# Patient Record
Sex: FEMALE | Race: WHITE | URBAN - METROPOLITAN AREA
[De-identification: names, ages, dates, MRNs, and addresses within clinical notes are randomized per-mention and may not be internally consistent; named-entity substitution may affect disease eponyms.]

---

## 2017-10-23 ENCOUNTER — HISTORICAL (OUTPATIENT)
Dept: ADMINISTRATIVE | Facility: HOSPITAL | Age: 52
End: 2017-10-23

## 2018-08-09 ENCOUNTER — HISTORICAL (OUTPATIENT)
Dept: RADIOLOGY | Facility: HOSPITAL | Age: 53
End: 2018-08-09

## 2020-02-20 LAB
BILIRUB SERPL-MCNC: NEGATIVE MG/DL
BLOOD URINE, POC: NEGATIVE
CLARITY, POC UA: NORMAL
COLOR, POC UA: YELLOW
GLUCOSE UR QL STRIP: NEGATIVE
KETONES UR QL STRIP: NEGATIVE
LEUKOCYTE EST, POC UA: NEGATIVE
NITRITE, POC UA: NEGATIVE
PH, POC UA: 5.5
PROTEIN, POC: NEGATIVE
SPECIFIC GRAVITY, POC UA: 1.01
UROBILINOGEN, POC UA: NORMAL

## 2022-04-11 ENCOUNTER — HISTORICAL (OUTPATIENT)
Dept: ADMINISTRATIVE | Facility: HOSPITAL | Age: 57
End: 2022-04-11

## 2022-04-29 VITALS
WEIGHT: 136.88 LBS | OXYGEN SATURATION: 96 % | BODY MASS INDEX: 23.37 KG/M2 | DIASTOLIC BLOOD PRESSURE: 70 MMHG | HEIGHT: 64 IN | SYSTOLIC BLOOD PRESSURE: 108 MMHG

## 2022-08-12 DIAGNOSIS — R52 ACHES: Primary | ICD-10-CM

## 2022-09-21 ENCOUNTER — HISTORICAL (OUTPATIENT)
Dept: ADMINISTRATIVE | Facility: HOSPITAL | Age: 57
End: 2022-09-21

## 2023-08-03 DIAGNOSIS — R42 DIZZINESS: Primary | ICD-10-CM

## 2023-08-17 ENCOUNTER — TELEPHONE (OUTPATIENT)
Dept: NEUROLOGY | Facility: CLINIC | Age: 58
End: 2023-08-17

## 2023-08-17 NOTE — TELEPHONE ENCOUNTER
L/M ashwin Aguiar with referring providers office explaining referral will continue to be on hold until patient cleared by ENT and records received for dizziness or clinic is contacted to cancel referral.

## 2024-06-13 ENCOUNTER — DOCUMENTATION ONLY (OUTPATIENT)
Dept: AUDIOLOGY | Facility: HOSPITAL | Age: 59
End: 2024-06-13
Payer: MEDICAID

## 2024-06-13 NOTE — PROGRESS NOTES
Spoke with patient regarding VNG testing, protocol and medication avoidance.  She reportedly does not take Meclizine nor pain medication.  Advised her to discontinue sleep medication the night prior to testing.  Mrs. Chavez endorses multiple autoimmune diagnosis and severe claustrophobia and panic attacks, therefore will not perform caloric irrigation testing under closed goggle array.  Will substitute with cervical VEMP testing at this time.  An audiological evaluation was previously performed at [facility] yielding significant SNHL, bilaterally.

## 2024-07-11 ENCOUNTER — CLINICAL SUPPORT (OUTPATIENT)
Dept: AUDIOLOGY | Facility: HOSPITAL | Age: 59
End: 2024-07-11
Payer: MEDICAID

## 2024-07-11 DIAGNOSIS — H81.90 DISORDER OF VESTIBULAR FUNCTION, UNSPECIFIED LATERALITY: Primary | ICD-10-CM

## 2024-07-11 PROCEDURE — 92567 TYMPANOMETRY: CPT | Performed by: AUDIOLOGIST-HEARING AID FITTER

## 2024-07-11 PROCEDURE — 92517 VEMP TEST I&R CERVICAL: CPT | Performed by: AUDIOLOGIST-HEARING AID FITTER

## 2024-07-11 PROCEDURE — 92540 BASIC VESTIBULAR EVALUATION: CPT | Performed by: AUDIOLOGIST-HEARING AID FITTER

## 2024-09-10 NOTE — PROGRESS NOTES
"[facility] Neurology Initial Office Visit Note    Initial Visit Date: 9/12/2024  Current Visit Date:  09/12/2024    Chief Complaint:     Chief Complaint   Patient presents with    Dizziness     Patient states she ant stay on her feet. She states she fall frequently.Patient states she has a lot of muscle spasms.        History of Present Illness:      This is 59 y.o. female with history of histoplasmosis s/p lobectomy, ?sceloroderma, COPD, ORTEGA, anxiety, depression who is referred for poor balance since 2011. Patient states that her left leg would "give out on her." She stated that she had multiple spinal surgeries in the past for chronic left leg weakness which had progressively worse since this past 1 year. Complaining of mostly allodynia and hypesthesia in left lower leg.  She had never underwent physical therapy.     Medications:     Current Outpatient Medications   Medication Instructions    albuterol (PROVENTIL/VENTOLIN HFA) 90 mcg/actuation inhaler 2 puffs, Every 4 hours PRN    ALPRAZolam (XANAX) 2 mg, Oral, 3 times daily    aspirin (ECOTRIN) 81 mg, Oral, Daily    magnesium gluconate 27.5 mg magne- sium (500 mg) Tab Oral, Once    multivitamin (THERAGRAN) per tablet 1 tablet, Oral, Daily    ramelteon (ROZEREM) 8 mg, Oral, Nightly PRN    rosuvastatin (CRESTOR) 20 mg, Oral    tiZANidine (ZANAFLEX) 4 mg, Oral, 3 times daily    TRELEGY ELLIPTA 100-62.5-25 mcg DsDv        Labs:     Results for orders placed or performed in visit on 09/21/22   POCT Urinalysis   Result Value Ref Range    Color, UA Yellow     Clarity, UA Slightly cloudy     Glucose, UA Negative     Bilirubin, POC Negative     Ketones, UA Negative     Spec Grav UA 1.015     Blood, UA Negative     pH, UA 5.5     Protein, POC Negative     Urobilinogen, UA 0.2 mg/dl     Nitrite, UA Negative     Leukocytes, UA Negative        Studies:      Vestibular Results 7/11/2024:     Gaze:              No gaze-evoked nystagmus.    Saccades:      Normal saccade velocity, " latency and accuracy.         Tracking:        Normal tracking accuracy and gain.  OPK:               No asymmetry noted.    Positionals:    No positional nystagmus noted for all test conditions with vision denied.  Positioning:   No paroxysmal positional nystagmus noted.   Calorics:         Not performed due to patient's severe claustrophobia (under goggle array) and severe anxiety.                          cVEMP testing performed in lieu of caloric testing.     cVEMP Results:     Right ear:        7.68      Left ear:           9.06      Asymmetry Ratio:    8%-Within normal limits      This patient's videonystagmogram was normal.  Oculomotor testing was within normal limits. Positional testing did not reveal any nystagmus for all test conditions with vision denied. Positioning testing was negative for canalith involvement, bilaterally.   cVEMP testing was within normal limits with a ratio of 8%.  This finding is indicative of normal saccule and inferior nerve function, bilaterally.  Todays findings indicative of normal peripheral function, bilaterally.         Review of Systems:     Review of Systems   All other systems reviewed and are negative.      Physical Exams:     Vitals:    09/12/24 1439   BP: 124/86   Pulse: 86   Resp: 16   Temp: 97.8 °F (36.6 °C)       Physical Exam  Vitals and nursing note reviewed.   Constitutional:       Appearance: Normal appearance.   HENT:      Head: Normocephalic and atraumatic.      Nose: Nose normal.      Mouth/Throat:      Mouth: Mucous membranes are moist.      Pharynx: Oropharynx is clear.   Eyes:      Conjunctiva/sclera: Conjunctivae normal.   Cardiovascular:      Rate and Rhythm: Normal rate and regular rhythm.      Pulses: Normal pulses.   Pulmonary:      Effort: Pulmonary effort is normal.      Breath sounds: Normal breath sounds.   Abdominal:      General: Abdomen is flat.   Musculoskeletal:         General: Normal range of motion.      Cervical back: Normal range of  motion.   Skin:     General: Skin is warm.   Neurological:      Mental Status: She is alert.         Comprehensive Neurological Exam:  Mental Status: Alert Oriented to Self, Date, and Place. Comprehension wnl. No dysarthria.   CN II - XII: NÉSTOR, No APD,  VFFC, No ptosis OU, EOMI without nystagmus, LT/Temp symmetric in CN V1-3 distribution, Hearing grossly intact, Face Symmetric, Tongue and Uvula midline, Trapezius symmetric bilateral.   Motor: tone and bulk wnl throughout, no abnormal involuntary or voluntary movements, 4/5 to confrontation in left deltoid and  4/5 left EHL/TA/PL/Quad, Fine finger movements wnl b/l, No pronator drift.   Sensory: LT, Proprioception, Vibration with hypesthesia in LLE, Temp decreased in L5/S1 distribution.   Reflexes: 2+ throughout, plantar reflexes downward bilateral.   Gait: left foot drop     Assessment:     This is 59 y.o. female with history of histoplasmosis s/p lobectomy, ?sceloroderma, COPD, ORTEGA, anxiety, depression who is referred for left L5-S1 radiculopathy lumbar and cervical C5 radiculopathy.      Problem List Items Addressed This Visit          Neuro    Left cervical radiculopathy    Relevant Orders    Ambulatory referral/consult to Physical/Occupational Therapy    MRI Cervical Spine Without Contrast    MRI Lumbar Spine Without Contrast    Chronic left-sided lumbar radiculopathy - Primary    Relevant Orders    Ambulatory referral/consult to Physical/Occupational Therapy    MRI Cervical Spine Without Contrast    MRI Lumbar Spine Without Contrast     Other Visit Diagnoses       Dorsalgia, unspecified        Relevant Orders    MRI Cervical Spine Without Contrast    MRI Lumbar Spine Without Contrast            Plan:     [] MRI C and Lumbar spine w/o Kwaku: patient prefers [facility] imaging   [] outpatient PT/OT    RTC 4 Months      Visit today is associated with current or anticipated ongoing medical care related to this patient's single serious condition/complex condition as  documented above.     I have explained the treatment plan, diagnosis, and prognosis to patient. All questions are answered to the best of my knowledge.     Face to face time 60 minutes, including documentation, chart review, counseling, education, review of test results, relevant medical records, and coordination of care.   I have explained the treatment plan, diagnosis, and prognosis to patient. All questions are answered to the best of my knowledge.       Dalia Sue MD   General Neurology  09/12/2024

## 2024-09-12 ENCOUNTER — OFFICE VISIT (OUTPATIENT)
Dept: NEUROLOGY | Facility: CLINIC | Age: 59
End: 2024-09-12
Payer: MEDICAID

## 2024-09-12 VITALS
RESPIRATION RATE: 16 BRPM | DIASTOLIC BLOOD PRESSURE: 86 MMHG | TEMPERATURE: 98 F | HEIGHT: 64 IN | OXYGEN SATURATION: 99 % | SYSTOLIC BLOOD PRESSURE: 124 MMHG | WEIGHT: 132.38 LBS | BODY MASS INDEX: 22.6 KG/M2 | HEART RATE: 86 BPM

## 2024-09-12 DIAGNOSIS — M54.12 LEFT CERVICAL RADICULOPATHY: ICD-10-CM

## 2024-09-12 DIAGNOSIS — M54.9 DORSALGIA, UNSPECIFIED: ICD-10-CM

## 2024-09-12 DIAGNOSIS — M54.16 CHRONIC LEFT-SIDED LUMBAR RADICULOPATHY: Primary | ICD-10-CM

## 2024-09-12 PROCEDURE — 99215 OFFICE O/P EST HI 40 MIN: CPT | Mod: PBBFAC | Performed by: PSYCHIATRY & NEUROLOGY

## 2024-09-12 RX ORDER — MAGNESIUM GLUCONATE 27.5 (500)
TABLET ORAL ONCE
COMMUNITY

## 2024-09-12 RX ORDER — ROSUVASTATIN CALCIUM 20 MG/1
20 TABLET, COATED ORAL
COMMUNITY
Start: 2024-09-03

## 2024-09-12 RX ORDER — TIZANIDINE 4 MG/1
4 TABLET ORAL 3 TIMES DAILY
COMMUNITY
Start: 2024-07-23

## 2024-09-12 RX ORDER — ALBUTEROL SULFATE 90 UG/1
2 INHALANT RESPIRATORY (INHALATION) EVERY 4 HOURS PRN
COMMUNITY
Start: 2024-08-15

## 2024-09-12 RX ORDER — RAMELTEON 8 MG/1
8 TABLET ORAL NIGHTLY PRN
COMMUNITY
Start: 2024-09-10

## 2024-09-12 RX ORDER — MULTIVITAMIN
1 TABLET ORAL DAILY
COMMUNITY

## 2024-09-12 RX ORDER — ALPRAZOLAM 2 MG/1
2 TABLET ORAL 3 TIMES DAILY
COMMUNITY
Start: 2024-08-13

## 2024-09-12 RX ORDER — ASPIRIN 81 MG/1
81 TABLET ORAL DAILY
COMMUNITY

## 2024-09-12 RX ORDER — FLUTICASONE FUROATE, UMECLIDINIUM BROMIDE AND VILANTEROL TRIFENATATE 100; 62.5; 25 UG/1; UG/1; UG/1
POWDER RESPIRATORY (INHALATION)
COMMUNITY
Start: 2024-07-01

## 2024-09-30 ENCOUNTER — TELEPHONE (OUTPATIENT)
Dept: NEUROLOGY | Facility: CLINIC | Age: 59
End: 2024-09-30
Payer: MEDICAID

## 2024-09-30 NOTE — TELEPHONE ENCOUNTER
----- Message from Dalia Sue MD sent at 9/30/2024  8:14 AM CDT -----  Regarding: MRI Results  Please let Ms. Chavez know that her MRI C and L spine showed mild degenerative changes but otherwise no major pathology needing surgical intervention seen. Patient will benefit from continuing PT/OT.

## 2024-10-01 NOTE — TELEPHONE ENCOUNTER
----- Message from Naomi sent at 10/1/2024  9:30 AM CDT -----  Regarding: returned call  Pt returned call. Pt can be reached at [phone]    Thank You

## 2024-11-15 ENCOUNTER — TELEPHONE (OUTPATIENT)
Dept: NEUROLOGY | Facility: CLINIC | Age: 59
End: 2024-11-15
Payer: MEDICAID

## 2024-11-15 NOTE — TELEPHONE ENCOUNTER
----- Message from Shyla sent at 11/15/2024 12:22 PM CST -----  Pt lvm @ 11:01 am requesting a call back from the nurse, pt can be reached @ [phone]

## 2024-11-15 NOTE — TELEPHONE ENCOUNTER
Patient states she is in bed for 2-3 days after physical therapy. Patient is asking if there another form of therapy to get relief. She wants to know if physical therapy is necessary?

## 2024-12-02 ENCOUNTER — TELEPHONE (OUTPATIENT)
Dept: NEUROLOGY | Facility: CLINIC | Age: 59
End: 2024-12-02
Payer: MEDICAID

## 2024-12-02 NOTE — TELEPHONE ENCOUNTER
----- Message from Dalia Sue MD sent at 12/2/2024 10:26 AM CST -----  Regarding: RE: Patient call back  Patient already undergoing evaluation at [facility].      Dr. Sue  ----- Message -----  From: Alia Stratton LPN  Sent: 12/2/2024  10:18 AM CST  To: Dalia Sue MD  Subject: FW: Patient call back                              ----- Message -----  From: Clover Avilez  Sent: 12/2/2024  10:15 AM CST  To: [facility] Neurology Clinical Support Staff  Subject: Patient call back                                Patient called and stated she would like to speak to a nurse regarding wanting a referral for physical therapy.     Patient can be reached at [phone]    Please Advise

## 2024-12-02 NOTE — TELEPHONE ENCOUNTER
Patient states she is still having pain from physical therapy. Patient was notified that she could ask for a decrease in the therapy treatment and she will not be going back to therapy. The next step will be discussed at the patients next appointment.

## 2024-12-10 DIAGNOSIS — M54.16 CHRONIC LEFT-SIDED LUMBAR RADICULOPATHY: Primary | ICD-10-CM

## 2024-12-17 ENCOUNTER — TELEPHONE (OUTPATIENT)
Dept: NEUROLOGY | Facility: CLINIC | Age: 59
End: 2024-12-17
Payer: MEDICAID

## 2024-12-17 NOTE — TELEPHONE ENCOUNTER
----- Message from Sharyn sent at 12/17/2024  8:43 AM CST -----  Regarding: pain management referral  Pt called about the pain management referral . She can't drive to [place] and needs clarification on what the pain management does in detail.  She would like a referral sent somewhere local .   Pt # [phone]  
Spoke with patient and she verbalized understanding.   
No

## 2025-01-22 ENCOUNTER — OFFICE VISIT (OUTPATIENT)
Dept: NEUROLOGY | Facility: CLINIC | Age: 60
End: 2025-01-22
Payer: MEDICAID

## 2025-01-22 DIAGNOSIS — M54.16 CHRONIC LEFT-SIDED LUMBAR RADICULOPATHY: Primary | ICD-10-CM

## 2025-05-16 ENCOUNTER — HOSPITAL ENCOUNTER (EMERGENCY)
Facility: HOSPITAL | Age: 60
Discharge: ANOTHER HOSPITAL | End: 2025-05-16
Attending: INTERNAL MEDICINE
Payer: MEDICAID

## 2025-05-16 ENCOUNTER — HOSPITAL ENCOUNTER (INPATIENT)
Facility: HOSPITAL | Age: 60
LOS: 3 days | Discharge: HOME | DRG: 037 | End: 2025-05-19
Attending: EMERGENCY MEDICINE | Admitting: INTERNAL MEDICINE
Payer: MEDICAID

## 2025-05-16 VITALS
WEIGHT: 125 LBS | RESPIRATION RATE: 18 BRPM | HEART RATE: 76 BPM | SYSTOLIC BLOOD PRESSURE: 126 MMHG | BODY MASS INDEX: 21.46 KG/M2 | DIASTOLIC BLOOD PRESSURE: 82 MMHG | OXYGEN SATURATION: 100 % | TEMPERATURE: 98 F

## 2025-05-16 DIAGNOSIS — I65.22 STENOSIS OF LEFT CAROTID ARTERY: ICD-10-CM

## 2025-05-16 DIAGNOSIS — I10 BENIGN ESSENTIAL HTN: ICD-10-CM

## 2025-05-16 DIAGNOSIS — I63.9 STROKE: ICD-10-CM

## 2025-05-16 DIAGNOSIS — R07.9 CHEST PAIN: ICD-10-CM

## 2025-05-16 DIAGNOSIS — I65.22 LEFT CAROTID STENOSIS: Primary | ICD-10-CM

## 2025-05-16 DIAGNOSIS — G45.3 AMAUROSIS FUGAX OF LEFT EYE: Primary | ICD-10-CM

## 2025-05-16 DIAGNOSIS — H54.62 VISION LOSS OF LEFT EYE: ICD-10-CM

## 2025-05-16 PROBLEM — F17.200 TOBACCO DEPENDENCY: Status: ACTIVE | Noted: 2025-05-16

## 2025-05-16 LAB
ALBUMIN SERPL-MCNC: 4.3 G/DL (ref 3.4–4.8)
ALBUMIN/GLOB SERPL: 1 RATIO (ref 1.1–2)
ALP SERPL-CCNC: 77 UNIT/L (ref 40–150)
ALT SERPL-CCNC: 10 UNIT/L (ref 0–55)
ANION GAP SERPL CALC-SCNC: 10 MEQ/L
APICAL FOUR CHAMBER EJECTION FRACTION: 60 %
APICAL TWO CHAMBER EJECTION FRACTION: 64 %
APTT PPP: 27.6 SECONDS (ref 23.2–33.7)
APTT PPP: 37.9 SECONDS (ref 23.2–33.7)
AST SERPL-CCNC: 17 UNIT/L (ref 11–45)
AV INDEX (PROSTH): 0.88
AV MEAN GRADIENT: 3 MMHG
AV PEAK GRADIENT: 6 MMHG
AV VALVE AREA BY VELOCITY RATIO: 2.4 CM²
AV VALVE AREA: 2.5 CM²
AV VELOCITY RATIO: 0.83
BASOPHILS # BLD AUTO: 0.03 X10(3)/MCL
BASOPHILS NFR BLD AUTO: 0.4 %
BILIRUB SERPL-MCNC: 0.4 MG/DL
BSA FOR ECHO PROCEDURE: 1.57 M2
BUN SERPL-MCNC: 11.3 MG/DL (ref 9.8–20.1)
CALCIUM SERPL-MCNC: 10.1 MG/DL (ref 8.4–10.2)
CHLORIDE SERPL-SCNC: 106 MMOL/L (ref 98–107)
CHOLEST SERPL-MCNC: 121 MG/DL
CHOLEST/HDLC SERPL: 3 {RATIO} (ref 0–5)
CO2 SERPL-SCNC: 23 MMOL/L (ref 23–31)
CREAT SERPL-MCNC: 0.68 MG/DL (ref 0.55–1.02)
CREAT/UREA NIT SERPL: 17
CV ECHO LV RWT: 0.42 CM
DOP CALC AO PEAK VEL: 1.2 M/S
DOP CALC AO VTI: 24.4 CM
DOP CALC LVOT AREA: 2.8 CM2
DOP CALC LVOT DIAMETER: 1.9 CM
DOP CALC LVOT PEAK VEL: 1 M/S
DOP CALC LVOT STROKE VOLUME: 60.6 CM3
DOP CALC MV VTI: 23.4 CM
DOP CALCLVOT PEAK VEL VTI: 21.4 CM
E WAVE DECELERATION TIME: 187 MSEC
E/A RATIO: 0.86
E/E' RATIO: 11 M/S
ECHO LV POSTERIOR WALL: 0.9 CM (ref 0.6–1.1)
EOSINOPHIL # BLD AUTO: 0.06 X10(3)/MCL (ref 0–0.9)
EOSINOPHIL NFR BLD AUTO: 0.8 %
ERYTHROCYTE [DISTWIDTH] IN BLOOD BY AUTOMATED COUNT: 12.8 % (ref 11.5–17)
EST. AVERAGE GLUCOSE BLD GHB EST-MCNC: 114 MG/DL
FRACTIONAL SHORTENING: 39.5 % (ref 28–44)
GFR SERPLBLD CREATININE-BSD FMLA CKD-EPI: >60 ML/MIN/1.73/M2
GLOBULIN SER-MCNC: 4.1 GM/DL (ref 2.4–3.5)
GLUCOSE SERPL-MCNC: 94 MG/DL (ref 82–115)
HBA1C MFR BLD: 5.6 %
HCT VFR BLD AUTO: 38.7 % (ref 37–47)
HDLC SERPL-MCNC: 46 MG/DL (ref 35–60)
HGB BLD-MCNC: 13.4 G/DL (ref 12–16)
HOLD SPECIMEN: NORMAL
HOLD SPECIMEN: NORMAL
IMM GRANULOCYTES # BLD AUTO: 0.02 X10(3)/MCL (ref 0–0.04)
IMM GRANULOCYTES NFR BLD AUTO: 0.3 %
INR PPP: 1
INTERVENTRICULAR SEPTUM: 0.9 CM (ref 0.6–1.1)
LDLC SERPL CALC-MCNC: 65 MG/DL (ref 50–140)
LEFT ATRIUM AREA SYSTOLIC (APICAL 2 CHAMBER): 14.2 CM2
LEFT ATRIUM AREA SYSTOLIC (APICAL 4 CHAMBER): 14.5 CM2
LEFT ATRIUM SIZE: 3.3 CM
LEFT ATRIUM VOLUME INDEX MOD: 21 ML/M2
LEFT ATRIUM VOLUME MOD: 33 ML
LEFT INTERNAL DIMENSION IN SYSTOLE: 2.6 CM (ref 2.1–4)
LEFT VENTRICLE DIASTOLIC VOLUME INDEX: 51.59 ML/M2
LEFT VENTRICLE DIASTOLIC VOLUME: 81 ML
LEFT VENTRICLE END DIASTOLIC VOLUME APICAL 2 CHAMBER: 48.6 ML
LEFT VENTRICLE END DIASTOLIC VOLUME APICAL 4 CHAMBER: 38.6 ML
LEFT VENTRICLE END SYSTOLIC VOLUME APICAL 2 CHAMBER: 33.3 ML
LEFT VENTRICLE END SYSTOLIC VOLUME APICAL 4 CHAMBER: 33.3 ML
LEFT VENTRICLE MASS INDEX: 78.5 G/M2
LEFT VENTRICLE SYSTOLIC VOLUME INDEX: 15.9 ML/M2
LEFT VENTRICLE SYSTOLIC VOLUME: 25 ML
LEFT VENTRICULAR INTERNAL DIMENSION IN DIASTOLE: 4.3 CM (ref 3.5–6)
LEFT VENTRICULAR MASS: 123.3 G
LV LATERAL E/E' RATIO: 14 M/S
LV SEPTAL E/E' RATIO: 9.3 M/S
LVED V (TEICH): 81.3 ML
LVES V (TEICH): 24.6 ML
LVOT MG: 2 MMHG
LVOT MV: 0.68 CM/S
LYMPHOCYTES # BLD AUTO: 2.76 X10(3)/MCL (ref 0.6–4.6)
LYMPHOCYTES NFR BLD AUTO: 36.2 %
MCH RBC QN AUTO: 33.7 PG (ref 27–31)
MCHC RBC AUTO-ENTMCNC: 34.6 G/DL (ref 33–36)
MCV RBC AUTO: 97.2 FL (ref 80–94)
MONOCYTES # BLD AUTO: 0.51 X10(3)/MCL (ref 0.1–1.3)
MONOCYTES NFR BLD AUTO: 6.7 %
MV MEAN GRADIENT: 1 MMHG
MV PEAK A VEL: 0.65 M/S
MV PEAK E VEL: 0.56 M/S
MV PEAK GRADIENT: 3 MMHG
MV VALVE AREA BY CONTINUITY EQUATION: 2.59 CM2
NEUTROPHILS # BLD AUTO: 4.25 X10(3)/MCL (ref 2.1–9.2)
NEUTROPHILS NFR BLD AUTO: 55.6 %
NRBC BLD AUTO-RTO: 0 %
OHS CV RV/LV RATIO: 0.7 CM
OHS LV EJECTION FRACTION SIMPSONS BIPLANE MOD: 63 %
PLATELET # BLD AUTO: 275 X10(3)/MCL (ref 130–400)
PMV BLD AUTO: 9.7 FL (ref 7.4–10.4)
POTASSIUM SERPL-SCNC: 4.3 MMOL/L (ref 3.5–5.1)
PROT SERPL-MCNC: 8.4 GM/DL (ref 5.8–7.6)
PROTHROMBIN TIME: 12.9 SECONDS (ref 11.4–14)
RA WIDTH: 3.68 CM
RBC # BLD AUTO: 3.98 X10(6)/MCL (ref 4.2–5.4)
RIGHT VENTRICLE DIASTOLIC BASEL DIMENSION: 3 CM
RIGHT VENTRICULAR END-DIASTOLIC DIMENSION: 2.97 CM
SODIUM SERPL-SCNC: 139 MMOL/L (ref 136–145)
TDI LATERAL: 0.04 M/S
TDI SEPTAL: 0.06 M/S
TDI: 0.05 M/S
TRICUSPID ANNULAR PLANE SYSTOLIC EXCURSION: 2.5 CM
TRIGL SERPL-MCNC: 51 MG/DL (ref 37–140)
TSH SERPL-ACNC: 0.62 UIU/ML (ref 0.35–4.94)
VLDLC SERPL CALC-MCNC: 10 MG/DL
WBC # BLD AUTO: 7.63 X10(3)/MCL (ref 4.5–11.5)
Z-SCORE OF LEFT VENTRICULAR DIMENSION IN END DIASTOLE: -0.48
Z-SCORE OF LEFT VENTRICULAR DIMENSION IN END SYSTOLE: -0.58

## 2025-05-16 PROCEDURE — 25500020 PHARM REV CODE 255

## 2025-05-16 PROCEDURE — 99222 1ST HOSP IP/OBS MODERATE 55: CPT | Mod: FS,,, | Performed by: PSYCHIATRY & NEUROLOGY

## 2025-05-16 PROCEDURE — 80061 LIPID PANEL: CPT

## 2025-05-16 PROCEDURE — 25000003 PHARM REV CODE 250: Performed by: INTERNAL MEDICINE

## 2025-05-16 PROCEDURE — 25000003 PHARM REV CODE 250: Performed by: EMERGENCY MEDICINE

## 2025-05-16 PROCEDURE — 99285 EMERGENCY DEPT VISIT HI MDM: CPT | Mod: 25

## 2025-05-16 PROCEDURE — 85610 PROTHROMBIN TIME: CPT | Performed by: INTERNAL MEDICINE

## 2025-05-16 PROCEDURE — 21400001 HC TELEMETRY ROOM

## 2025-05-16 PROCEDURE — 63600175 PHARM REV CODE 636 W HCPCS: Performed by: EMERGENCY MEDICINE

## 2025-05-16 PROCEDURE — 80053 COMPREHEN METABOLIC PANEL: CPT | Performed by: INTERNAL MEDICINE

## 2025-05-16 PROCEDURE — 99285 EMERGENCY DEPT VISIT HI MDM: CPT | Mod: 25,27

## 2025-05-16 PROCEDURE — 25000003 PHARM REV CODE 250

## 2025-05-16 PROCEDURE — 85730 THROMBOPLASTIN TIME PARTIAL: CPT | Performed by: INTERNAL MEDICINE

## 2025-05-16 PROCEDURE — 84443 ASSAY THYROID STIM HORMONE: CPT

## 2025-05-16 PROCEDURE — 11000001 HC ACUTE MED/SURG PRIVATE ROOM

## 2025-05-16 PROCEDURE — 83036 HEMOGLOBIN GLYCOSYLATED A1C: CPT

## 2025-05-16 PROCEDURE — 85730 THROMBOPLASTIN TIME PARTIAL: CPT | Performed by: EMERGENCY MEDICINE

## 2025-05-16 PROCEDURE — 85025 COMPLETE CBC W/AUTO DIFF WBC: CPT | Performed by: INTERNAL MEDICINE

## 2025-05-16 RX ORDER — IBUPROFEN 200 MG
16 TABLET ORAL
Status: DISCONTINUED | OUTPATIENT
Start: 2025-05-16 | End: 2025-05-19 | Stop reason: HOSPADM

## 2025-05-16 RX ORDER — ATORVASTATIN CALCIUM 40 MG/1
80 TABLET, FILM COATED ORAL DAILY
Status: DISCONTINUED | OUTPATIENT
Start: 2025-05-17 | End: 2025-05-19 | Stop reason: HOSPADM

## 2025-05-16 RX ORDER — TALC
9 POWDER (GRAM) TOPICAL NIGHTLY PRN
Status: DISCONTINUED | OUTPATIENT
Start: 2025-05-16 | End: 2025-05-19 | Stop reason: HOSPADM

## 2025-05-16 RX ORDER — HEPARIN SODIUM,PORCINE/D5W 25000/250
0-40 INTRAVENOUS SOLUTION INTRAVENOUS CONTINUOUS
Status: DISCONTINUED | OUTPATIENT
Start: 2025-05-16 | End: 2025-05-16 | Stop reason: HOSPADM

## 2025-05-16 RX ORDER — IPRATROPIUM BROMIDE AND ALBUTEROL SULFATE 2.5; .5 MG/3ML; MG/3ML
3 SOLUTION RESPIRATORY (INHALATION) EVERY 6 HOURS PRN
Status: DISCONTINUED | OUTPATIENT
Start: 2025-05-16 | End: 2025-05-19 | Stop reason: HOSPADM

## 2025-05-16 RX ORDER — ALUMINUM HYDROXIDE, MAGNESIUM HYDROXIDE, AND SIMETHICONE 1200; 120; 1200 MG/30ML; MG/30ML; MG/30ML
30 SUSPENSION ORAL 4 TIMES DAILY PRN
Status: DISCONTINUED | OUTPATIENT
Start: 2025-05-16 | End: 2025-05-19 | Stop reason: HOSPADM

## 2025-05-16 RX ORDER — HEPARIN SODIUM,PORCINE/D5W 25000/250
0-40 INTRAVENOUS SOLUTION INTRAVENOUS CONTINUOUS
Status: DISCONTINUED | OUTPATIENT
Start: 2025-05-16 | End: 2025-05-16

## 2025-05-16 RX ORDER — ALPRAZOLAM 0.5 MG/1
2 TABLET ORAL
Status: COMPLETED | OUTPATIENT
Start: 2025-05-16 | End: 2025-05-16

## 2025-05-16 RX ORDER — GLUCAGON 1 MG
1 KIT INJECTION
Status: DISCONTINUED | OUTPATIENT
Start: 2025-05-16 | End: 2025-05-19 | Stop reason: HOSPADM

## 2025-05-16 RX ORDER — SODIUM CHLORIDE 0.9 % (FLUSH) 0.9 %
10 SYRINGE (ML) INJECTION
Status: DISCONTINUED | OUTPATIENT
Start: 2025-05-16 | End: 2025-05-16

## 2025-05-16 RX ORDER — IBUPROFEN 200 MG
24 TABLET ORAL
Status: DISCONTINUED | OUTPATIENT
Start: 2025-05-16 | End: 2025-05-19 | Stop reason: HOSPADM

## 2025-05-16 RX ORDER — SODIUM CHLORIDE 9 MG/ML
INJECTION, SOLUTION INTRAVENOUS CONTINUOUS
Status: DISCONTINUED | OUTPATIENT
Start: 2025-05-16 | End: 2025-05-19

## 2025-05-16 RX ORDER — AMOXICILLIN 250 MG
1 CAPSULE ORAL 2 TIMES DAILY PRN
Status: DISCONTINUED | OUTPATIENT
Start: 2025-05-17 | End: 2025-05-19 | Stop reason: HOSPADM

## 2025-05-16 RX ORDER — ACETAMINOPHEN 325 MG/1
650 TABLET ORAL EVERY 4 HOURS PRN
Status: DISCONTINUED | OUTPATIENT
Start: 2025-05-16 | End: 2025-05-18

## 2025-05-16 RX ORDER — NAPROXEN SODIUM 220 MG/1
324 TABLET, FILM COATED ORAL
Status: COMPLETED | OUTPATIENT
Start: 2025-05-16 | End: 2025-05-16

## 2025-05-16 RX ORDER — ONDANSETRON HYDROCHLORIDE 2 MG/ML
4 INJECTION, SOLUTION INTRAVENOUS EVERY 8 HOURS PRN
Status: DISCONTINUED | OUTPATIENT
Start: 2025-05-16 | End: 2025-05-18

## 2025-05-16 RX ORDER — NALOXONE HCL 0.4 MG/ML
0.02 VIAL (ML) INJECTION
Status: DISCONTINUED | OUTPATIENT
Start: 2025-05-16 | End: 2025-05-19 | Stop reason: HOSPADM

## 2025-05-16 RX ORDER — ONDANSETRON 4 MG/1
8 TABLET, ORALLY DISINTEGRATING ORAL EVERY 8 HOURS PRN
Status: DISCONTINUED | OUTPATIENT
Start: 2025-05-16 | End: 2025-05-19 | Stop reason: HOSPADM

## 2025-05-16 RX ORDER — POLYETHYLENE GLYCOL 3350 17 G/17G
17 POWDER, FOR SOLUTION ORAL 3 TIMES DAILY PRN
Status: DISCONTINUED | OUTPATIENT
Start: 2025-05-16 | End: 2025-05-19 | Stop reason: HOSPADM

## 2025-05-16 RX ORDER — IBUPROFEN 200 MG
1 TABLET ORAL DAILY PRN
Status: DISCONTINUED | OUTPATIENT
Start: 2025-05-16 | End: 2025-05-19 | Stop reason: HOSPADM

## 2025-05-16 RX ORDER — BISACODYL 10 MG/1
10 SUPPOSITORY RECTAL DAILY PRN
Status: DISCONTINUED | OUTPATIENT
Start: 2025-05-16 | End: 2025-05-19 | Stop reason: HOSPADM

## 2025-05-16 RX ORDER — SIMETHICONE 80 MG
1 TABLET,CHEWABLE ORAL 4 TIMES DAILY PRN
Status: DISCONTINUED | OUTPATIENT
Start: 2025-05-16 | End: 2025-05-19 | Stop reason: HOSPADM

## 2025-05-16 RX ORDER — SODIUM CHLORIDE 0.9 % (FLUSH) 0.9 %
10 SYRINGE (ML) INJECTION
Status: DISCONTINUED | OUTPATIENT
Start: 2025-05-16 | End: 2025-05-19 | Stop reason: HOSPADM

## 2025-05-16 RX ORDER — LABETALOL HYDROCHLORIDE 5 MG/ML
10 INJECTION, SOLUTION INTRAVENOUS EVERY 4 HOURS PRN
Status: DISCONTINUED | OUTPATIENT
Start: 2025-05-16 | End: 2025-05-19 | Stop reason: HOSPADM

## 2025-05-16 RX ORDER — HEPARIN SODIUM,PORCINE/D5W 25000/250
0-40 INTRAVENOUS SOLUTION INTRAVENOUS CONTINUOUS
Status: DISCONTINUED | OUTPATIENT
Start: 2025-05-16 | End: 2025-05-18

## 2025-05-16 RX ADMIN — HEPARIN SODIUM 12 UNITS/KG/HR: 10000 INJECTION, SOLUTION INTRAVENOUS at 04:05

## 2025-05-16 RX ADMIN — IOHEXOL 100 ML: 350 INJECTION, SOLUTION INTRAVENOUS at 01:05

## 2025-05-16 RX ADMIN — ALPRAZOLAM 2 MG: 0.5 TABLET ORAL at 06:05

## 2025-05-16 RX ADMIN — SODIUM CHLORIDE: 9 INJECTION, SOLUTION INTRAVENOUS at 05:05

## 2025-05-16 RX ADMIN — ASPIRIN 81 MG CHEWABLE TABLET 324 MG: 81 TABLET CHEWABLE at 02:05

## 2025-05-16 NOTE — HPI
"Ashlie Chavez is a 60 y.o. female with PMH including COPD, CAD, depression, immune disorder, and neuropathy who presented to [facility] as a transfer from eye clinic for further evaluation of blurry vision, dizziness, and nausea since Wednesday 5/14. Patient presented after eye dr himanshu 2/2 episode of apparent amaurosis fugax a few days ago. She states [place], she noticed the lines of the road became "wavy" and then she lost complete vision on her left eye. She states she had "tadpole like" floaters in her right eye. She also developed associated fatigue and [place] 2 hours. She attributed symptoms to history of uveitis and therefore did not seek medical attention immediately. Her vision loss persisted throughout the day Thursday. She presented to ophthalmologist today (Friday) with unremarkable eye exam. It was recommended to present to ED for TIA workup. CTA h/n completed at outlying facility revealed dense calcified plaque in left carotid bulb and proximal internal carotid artery with high grade stenosis in left proximal internal carotid with small amount of flow seen in distal internal carotid artery. No flow seen at supraclinoid left internal carotid artery. Dr. Hernandez was notified of patient and imaging, after reviewing images he determined patient had critical left carotid artery stenosis and left CRAO. It was recommended the patient be initiated on a minimal intensity heparin infusion and transferred to [facility] for vascular surgery consult and CRAO evaluation/ workup.     Additionally during my interview with the patient, she states she experienced an episode of left mouth numbness with drooping and drooling. She states this occurred roughly 1 week ago, and lasted for a day with complete resolution.   "

## 2025-05-16 NOTE — ED PROVIDER NOTES
"Encounter Date: 5/16/2025       History     Chief Complaint   Patient presents with    Eye Problem     PT SENT FROM EYE CLINIC FOR FURTHER EVAL OF BLURRY VISION, DIZZINESS AND NAUSEA SINCE WEDNESDAY 5/14/25.  PT VAN NEG IN TRIAGE.  SEE REFERRAL NOTE.       Presents from [facility] due to an episode of apparent Amaurosis Fugax few days ago. Pt states , while driving on Tuesday, noticed the road lines tuned "wavy" and then was not able to see thought her left eye. She thought it was related to her history of uveitis for which did not seek medical evaluation. States vision slowly return to the eye, but still blurry. Went to her ophthalmologist today which tole her the eye exam was unremarkable and recommended ED evaluation due to TIA workup needed. Pt states also having a headache.     Hx of Schogrens, Neuropathy, CAD, COPD    The history is provided by the patient.     Review of patient's allergies indicates:   Allergen Reactions    Buspirone     Clonazepam     Codeine Hives    Cyclobenzaprine     Eszopiclone Swelling    Fentanyl     Gabapentin Swelling    Hydrocodone     Imipramine     Ketamine     Methadone     Morphine Swelling    Oxycodone-acetaminophen Swelling    Pregabalin Swelling    Quetiapine     Sulfanilamide     Trazodone     Venlafaxine     Zolpidem Swelling    Amitriptyline     Brexpiprazole     Duloxetine     Flonase [fluticasone] Other (See Comments)    Fluoxetine Other (See Comments)    Methocarbamol     Oxycodone-aspirin     Propoxyphene      Past Medical History:   Diagnosis Date    Arthritis     COPD (chronic obstructive pulmonary disease)     Coronary artery disease     Depression     Histoplasmosis 2018    Immune deficiency disorder     Mental disorder     Neuropathy      Past Surgical History:   Procedure Laterality Date    CHOLECYSTECTOMY      lobedectomy Left     SPINE SURGERY  2011     Family History   Problem Relation Name Age of Onset    No Known Problems Mother      No Known " Problems Father       Social History[1]  Review of Systems   Eyes:  Positive for visual disturbance.   Neurological:  Positive for headaches.       Physical Exam     Initial Vitals [05/16/25 1037]   BP Pulse Resp Temp SpO2   97/64 77 16 97.9 °F (36.6 °C) 99 %      MAP       --         Physical Exam    Nursing note and vitals reviewed.  Constitutional: She appears well-developed. No distress.   HENT:   Head: Normocephalic and atraumatic. Mouth/Throat: Oropharynx is clear and moist. No oropharyngeal exudate.   Eyes: Conjunctivae and EOM are normal. Pupils are equal, round, and reactive to light.   Pupils dilated (After eye exam today)   Neck: Neck supple. No thyromegaly present. No JVD present.   NO carotid bruit   Normal range of motion.  Cardiovascular:  Normal rate, regular rhythm, normal heart sounds and intact distal pulses.           Pulmonary/Chest: Breath sounds normal.   Abdominal: Abdomen is soft. Bowel sounds are normal. She exhibits no distension. There is no abdominal tenderness. There is no rebound and no guarding.   Musculoskeletal:         General: No edema. Normal range of motion.      Cervical back: Normal range of motion and neck supple.     Neurological: She is alert and oriented to person, place, and time. She has normal strength. No cranial nerve deficit. GCS score is 15. GCS eye subscore is 4. GCS verbal subscore is 5. GCS motor subscore is 6.   Skin: Skin is warm and dry. No rash noted.   Psychiatric: Thought content normal.         ED Course   Procedures  Labs Reviewed   COMPREHENSIVE METABOLIC PANEL - Abnormal       Result Value    Sodium 139      Potassium 4.3      Chloride 106      CO2 23      Glucose 94      Blood Urea Nitrogen 11.3      Creatinine 0.68      Calcium 10.1      Protein Total 8.4 (*)     Albumin 4.3      Globulin 4.1 (*)     Albumin/Globulin Ratio 1.0 (*)     Bilirubin Total 0.4      ALP 77      ALT 10      AST 17      eGFR >60      Anion Gap 10.0      BUN/Creatinine Ratio 17      CBC WITH DIFFERENTIAL - Abnormal    WBC 7.63      RBC 3.98 (*)     Hgb 13.4      Hct 38.7      MCV 97.2 (*)     MCH 33.7 (*)     MCHC 34.6      RDW 12.8      Platelet 275      MPV 9.7      Neut % 55.6      Lymph % 36.2      Mono % 6.7      Eos % 0.8      Basophil % 0.4      Imm Grans % 0.3      Neut # 4.25      Lymph # 2.76      Mono # 0.51      Eos # 0.06      Baso # 0.03      Imm Gran # 0.02      NRBC% 0.0     PROTIME-INR - Normal    PT 12.9      INR 1.0      Narrative:     Protimes are used to monitor anticoagulant agents such as warfarin. PT INR values are based on the current patient normal mean and the LEEROY value for the specific instrument reagent used.  **Routine theraputic target values for the INR are 2.0-3.0**   APTT - Normal    PTT 27.6     CBC W/ AUTO DIFFERENTIAL    Narrative:     The following orders were created for panel order CBC auto differential.  Procedure                               Abnormality         Status                     ---------                               -----------         ------                     CBC with Differential[[id number]]       Abnormal            Final result                 Please view results for these tests on the individual orders.   EXTRA TUBES    Narrative:     The following orders were created for panel order EXTRA TUBES.  Procedure                               Abnormality         Status                     ---------                               -----------         ------                     Light Blue Top Hold[[id number]]                             In process                 Gold Top Hold[[id number]]                                   In process                   Please view results for these tests on the individual orders.   LIGHT BLUE TOP HOLD   GOLD TOP HOLD          Imaging Results              CTA Head and Neck (xpd) (In process)                      Medications   aspirin chewable tablet 324 mg (has no administration in time range)   heparin 25,000  units in dextrose 5% 250 ml (100 units/mL) infusion MINIMAL INTENSITY nomogram - LAF (has no administration in time range)   iohexoL (OMNIPAQUE 350) 350 mg iodine/mL injection (100 mLs Intravenous Given 5/16/25 1337)     Medical Decision Making  Amount and/or Complexity of Data Reviewed  Labs: ordered. Decision-making details documented in ED Course.  Radiology: ordered and independent interpretation performed. Decision-making details documented in ED Course.  ECG/medicine tests: ordered and independent interpretation performed. Decision-making details documented in ED Course.  Discussion of management or test interpretation with external provider(s): 1:38 PM Consult: I discussed the case with Dr. Hernandez (Interventional Neurology), pt with critical stenosis Lt carotid artery, start low intensity heparin drip and will take her to [facility] for vascular surgery intervention      Risk  OTC drugs.  Prescription drug management.      Additional MDM:   Differential Diagnosis:   Stroke, TIA, Peripheral neuropathy, thyroid disease, medication or drugs side effects, among others                                        Clinical Impression:  Final diagnoses:  [G45.3] Amaurosis fugax of left eye (Primary)  [I65.22] Stenosis of left carotid artery          ED Disposition Condition    Transfer to Another Facility Serious                  Petey Menjivar MD  05/16/25 1352       Petey Menjivar MD  05/16/25 1358         [1]   Social History  Tobacco Use    Smoking status: Every Day     Current packs/day: 0.50     Average packs/day: 0.5 packs/day for 42.4 years (21.2 ttl pk-yrs)     Types: Cigarettes     Start date: 1983    Smokeless tobacco: Never    Tobacco comments:     3 cigarettes a day.    Substance Use Topics    Alcohol use: Not Currently    Drug use: Yes     Types: Marijuana     Comment: Patient taking marijuana for pain        Petey Menjivar MD  05/16/25 1400

## 2025-05-16 NOTE — CONSULTS
"[facility] - Emergency Dept  Neurology  Consult Note    Patient Name: Ashlie Chavez  MRN: [id number]  Admission Date: 5/16/2025  Hospital Length of Stay: 0 days  Code Status: No Order   Attending Provider: Marge Haro MD   Consulting Provider: ANDREW Vasquez  Primary Care Physician: Charlotte Islas MD  Principal Problem:<principal problem not specified>    Consults   Subjective:     Chief Complaint:    Chief Complaint   Patient presents with    Transfer     Arrives via [facility] from [facility]. Transfer for carotid stenosis. On heparin gtt on arrival.      HPI:   Ashlie Chavez is a 60 y.o. female with PMH including COPD, CAD, depression, immune disorder, and neuropathy who presented to [facility] as a transfer from eye clinic for further evaluation of blurry vision, dizziness, and nausea since Wednesday 5/14. Patient presented after eye dr appt 2/2 episode of apparent amaurosis fugax a few days ago. She states [place], she noticed the lines of the road became "wavy" and then she lost complete vision on her left eye. She states she had "tadpole like" floaters in her right eye. She also developed associated fatigue and [place] 2 hours. She attributed symptoms to history of uveitis and therefore did not seek medical attention immediately. Her vision loss persisted throughout the day Thursday. She presented to ophthalmologist today (Friday) with unremarkable eye exam. It was recommended to present to ED for TIA workup. CTA h/n completed at outlying facility revealed dense calcified plaque in left carotid bulb and proximal internal carotid artery with high grade stenosis in left proximal internal carotid with small amount of flow seen in distal internal carotid artery. No flow seen at supraclinoid left internal carotid artery. Dr. Hernandez was notified of patient and imaging, after reviewing images he determined patient had " critical left carotid artery stenosis and left CRAO. It was recommended the patient be initiated on a minimal intensity heparin infusion and transferred to [facility] for vascular surgery consult and CRAO evaluation/ workup.     Additionally during my interview with the patient, she states she experienced an episode of left mouth numbness with drooping and drooling. She states this occurred roughly 1 week ago, and lasted for a day with complete resolution.      Past Medical History:   Diagnosis Date    Arthritis     COPD (chronic obstructive pulmonary disease)     Coronary artery disease     Depression     Histoplasmosis 2018    Immune deficiency disorder     Mental disorder     Neuropathy        Past Surgical History:   Procedure Laterality Date    CHOLECYSTECTOMY      lobedectomy Left     SPINE SURGERY  2011       Review of patient's allergies indicates:   Allergen Reactions    Buspirone     Clonazepam     Codeine Hives    Cyclobenzaprine     Eszopiclone Swelling    Fentanyl     Gabapentin Swelling    Hydrocodone     Imipramine     Ketamine     Methadone     Morphine Swelling    Oxycodone-acetaminophen Swelling    Pregabalin Swelling    Quetiapine     Sulfanilamide     Trazodone     Venlafaxine     Zolpidem Swelling    Amitriptyline     Brexpiprazole     Duloxetine     Flonase [fluticasone] Other (See Comments)    Fluoxetine Other (See Comments)    Methocarbamol     Oxycodone-aspirin     Propoxyphene        Current Neurological Medications:      Current Facility-Administered Medications on File Prior to Encounter   Medication    [COMPLETED] aspirin chewable tablet 324 mg    [COMPLETED] iohexoL (OMNIPAQUE 350) 350 mg iodine/mL injection    [DISCONTINUED] heparin 25,000 units in dextrose 5% 250 ml (100 units/mL) infusion MINIMAL INTENSITY nomogram - [facility]     Current Outpatient Medications on File Prior to Encounter   Medication Sig    albuterol (PROVENTIL/VENTOLIN HFA) 90 mcg/actuation inhaler 2 puffs every 4 (four) hours  as needed.    ALPRAZolam (XANAX) 2 MG Tab Take 2 mg by mouth 3 (three) times daily.    aspirin (ECOTRIN) 81 MG EC tablet Take 81 mg by mouth once daily.    ramelteon (ROZEREM) 8 mg tablet Take 8 mg by mouth nightly as needed.    rosuvastatin (CRESTOR) 20 MG tablet Take 20 mg by mouth every evening.    tiZANidine (ZANAFLEX) 4 MG tablet Take 4 mg by mouth 3 (three) times daily.    TRELEGY ELLIPTA 100-62.5-25 mcg DsDv      Family History       Problem Relation (Age of Onset)    No Known Problems Mother, Father          Tobacco Use    Smoking status: Every Day     Current packs/day: 0.50     Average packs/day: 0.5 packs/day for 42.4 years (21.2 ttl pk-yrs)     Types: Cigarettes     Start date: 1983    Smokeless tobacco: Never    Tobacco comments:     3 cigarettes a day.    Substance and Sexual Activity    Alcohol use: Not Currently    Drug use: Yes     Types: Marijuana     Comment: Patient taking marijuana for pain    Sexual activity: Not Currently     Review of Systems   Eyes:  Positive for visual disturbance.   Neurological:  Positive for weakness (chronic LUE weakness) and numbness (chronic neuropathy). Negative for dizziness, tremors, seizures, syncope, facial asymmetry, speech difficulty, light-headedness and headaches.   Psychiatric/Behavioral:  Negative for agitation, behavioral problems and confusion.      Objective:     Vital Signs (Most Recent):  Temp: 98.1 °F (36.7 °C) (05/16/25 1534)  Pulse: 78 (05/16/25 1534)  Resp: 16 (05/16/25 1534)  BP: (!) 150/72 (05/16/25 1534)  SpO2: 98 % (05/16/25 1534) Vital Signs (24h Range):  Temp:  [97.9 °F (36.6 °C)-98.2 °F (36.8 °C)] 98.1 °F (36.7 °C)  Pulse:  [66-78] 78  Resp:  [16-18] 16  SpO2:  [96 %-100 %] 98 %  BP: ()/(64-95) 150/72     Weight: 54.4 kg (120 lb)  Body mass index is 20.6 kg/m².     Physical Exam  Constitutional:       General: She is not in acute distress.     Appearance: Normal appearance. She is normal weight. She is not ill-appearing,  "toxic-appearing or diaphoretic.   HENT:      Head: Normocephalic and atraumatic.      Nose: Nose normal.      Mouth/Throat:      Mouth: Mucous membranes are moist.   Eyes:      General: Visual field deficit (left eye complete hemianopia) present.      Extraocular Movements: Extraocular movements intact.      Pupils: Pupils are equal, round, and reactive to light.      Comments: Left eye vision loss, able to see shadows centrally, appears to have complete hemianopia peripherally   Right eye with "tadpole" floaters present per patient; blurry vision present    Pulmonary:      Effort: Pulmonary effort is normal. No respiratory distress.   Musculoskeletal:         General: Normal range of motion.      Right lower leg: No edema.      Left lower leg: No edema.   Skin:     General: Skin is warm.      Capillary Refill: Capillary refill takes less than 2 seconds.      Findings: No lesion.   Neurological:      Mental Status: She is alert and oriented to person, place, and time. Mental status is at baseline.      GCS: GCS eye subscore is 4. GCS verbal subscore is 5. GCS motor subscore is 6.      Cranial Nerves: No cranial nerve deficit, dysarthria or facial asymmetry.      Sensory: No sensory deficit.      Motor: No weakness, tremor, atrophy, abnormal muscle tone, seizure activity or pronator drift.   Psychiatric:         Mood and Affect: Mood normal.         Behavior: Behavior normal.          NEUROLOGICAL EXAMINATION:     MENTAL STATUS   Oriented to person, place, and time.     CRANIAL NERVES     CN III, IV, VI   Pupils are equal, round, and reactive to light.      Significant Labs: CBC:   Recent Labs   Lab 05/16/25  1113   WBC 7.63   HGB 13.4   HCT 38.7        CMP:   Recent Labs   Lab 05/16/25  1113   GLU 94      K 4.3      CO2 23   BUN 11.3   CREATININE 0.68   CALCIUM 10.1   PROT 8.4*   ALBUMIN 4.3   BILITOT 0.4   ALKPHOS 77   AST 17   ALT 10     All pertinent lab results from the past 24 hours have been " reviewed.    Significant Imaging: I have reviewed all pertinent imaging results/findings within the past 24 hours.  Assessment and Plan:     Vision loss of left eye  - presented with acute vision loss of left eye 2 days prior   - Stroke RF: smoker  - Intervention: none, OOW   - Etiology: TBD    Stroke workup:  -CTA h/n: critical left ICA stenosis, left CRAO   -MRI brain: ordered   -ECHO: ordered   -LDL: ordered   -A1c: ordered   -TSH: ordered   -home medications include: aspirin 81 mg, crestor    NIH Stroke Scale      1a  Level of consciousness: 0=alert; keenly responsive   1b. LOC questions:  0=Answers both tasks correctly   1c. LOC commands: 0=Answers both tasks correctly   2.  Best Gaze: 0=normal   3.  Visual: 2=Complete hemianopia   4. Facial Palsy: 0=Normal symmetric movement   5a.  Motor left arm: 0=No drift, limb holds 90 (or 45) degrees for full 10 seconds   5b.  Motor right arm: 0=No drift, limb holds 90 (or 45) degrees for full 10 seconds   6a. motor left le=No drift, limb holds 90 (or 45) degrees for full 10 seconds   6b  Motor right le=No drift, limb holds 90 (or 45) degrees for full 10 seconds   7. Limb Ataxia: 0=Absent   8.  Sensory: 0=Normal; no sensory loss   9. Best Language:  0=No aphasia, normal   10. Dysarthria: 0=Normal   11. Extinction and Inattention: 0=No abnormality   12. Distal motor function: 0=Normal    Total:   2         Plan:  -CTA h/n with critical stenosis of left ICA and left CRAO  -consult vascular surgery (Dr. Hodgson) for possible CEA  -initiated on minimal intensity heparin infusion  -MRI brain w/ out contrast ordered  -initiate IVF with NS @ 100 ml/hr   -PT/OT/ST to evaluate  -SCD for DVT prophylaxis   -Normalize BP  -Neuro checks q4hr ... stat CTh if any neuro change   -Continuous telemetry monitoring    Further recommendations to follow from MD            VTE Risk Mitigation (From admission, onward)           Ordered     heparin 25,000 units in dextrose 5% 250 ml (100  units/mL) infusion MINIMAL INTENSITY nomogram - [facility]  Continuous        Question:  Begin at (units/kg/hr)  Answer:  12    05/16/25 1557                    ANDREW Vasquez  Neurology  [facility] - Emergency Dept

## 2025-05-16 NOTE — ASSESSMENT & PLAN NOTE
- presented with acute vision loss of left eye 2 days prior   - Stroke RF: smoker  - Intervention: none, OOW   - Etiology: TBD    Stroke workup:  -CTA h/n: critical left ICA stenosis, left CRAO   -MRI brain: ordered   -ECHO: ordered   -LDL: ordered   -A1c: ordered   -TSH: ordered   -home medications include: aspirin 81 mg, crestor    NIH Stroke Scale      1a  Level of consciousness: 0=alert; keenly responsive   1b. LOC questions:  0=Answers both tasks correctly   1c. LOC commands: 0=Answers both tasks correctly   2.  Best Gaze: 0=normal   3.  Visual: 2=Complete hemianopia   4. Facial Palsy: 0=Normal symmetric movement   5a.  Motor left arm: 0=No drift, limb holds 90 (or 45) degrees for full 10 seconds   5b.  Motor right arm: 0=No drift, limb holds 90 (or 45) degrees for full 10 seconds   6a. motor left le=No drift, limb holds 90 (or 45) degrees for full 10 seconds   6b  Motor right le=No drift, limb holds 90 (or 45) degrees for full 10 seconds   7. Limb Ataxia: 0=Absent   8.  Sensory: 0=Normal; no sensory loss   9. Best Language:  0=No aphasia, normal   10. Dysarthria: 0=Normal   11. Extinction and Inattention: 0=No abnormality   12. Distal motor function: 0=Normal    Total:   2         Plan:  -CTA h/n with critical stenosis of left ICA and left CRAO  -consult vascular surgery (Dr. Hodgson) for possible CEA  -initiated on minimal intensity heparin infusion  -MRI brain w/ out contrast ordered  -initiate IVF with NS @ 100 ml/hr   -PT/OT/ST to evaluate  -SCD for DVT prophylaxis   -Normalize BP  -Neuro checks q4hr ... stat CTh if any neuro change   -Continuous telemetry monitoring    Further recommendations to follow from MD

## 2025-05-16 NOTE — ED NOTES
Bed: 16  Expected date:   Expected time:   Means of arrival:   Comments:  [facility] tx

## 2025-05-16 NOTE — SUBJECTIVE & OBJECTIVE
Past Medical History:   Diagnosis Date    Arthritis     COPD (chronic obstructive pulmonary disease)     Coronary artery disease     Depression     Histoplasmosis 2018    Immune deficiency disorder     Mental disorder     Neuropathy        Past Surgical History:   Procedure Laterality Date    CHOLECYSTECTOMY      lobedectomy Left     SPINE SURGERY  2011       Review of patient's allergies indicates:   Allergen Reactions    Buspirone     Clonazepam     Codeine Hives    Cyclobenzaprine     Eszopiclone Swelling    Fentanyl     Gabapentin Swelling    Hydrocodone     Imipramine     Ketamine     Methadone     Morphine Swelling    Oxycodone-acetaminophen Swelling    Pregabalin Swelling    Quetiapine     Sulfanilamide     Trazodone     Venlafaxine     Zolpidem Swelling    Amitriptyline     Brexpiprazole     Duloxetine     Flonase [fluticasone] Other (See Comments)    Fluoxetine Other (See Comments)    Methocarbamol     Oxycodone-aspirin     Propoxyphene        Current Neurological Medications:      Current Facility-Administered Medications on File Prior to Encounter   Medication    [COMPLETED] aspirin chewable tablet 324 mg    [COMPLETED] iohexoL (OMNIPAQUE 350) 350 mg iodine/mL injection    [DISCONTINUED] heparin 25,000 units in dextrose 5% 250 ml (100 units/mL) infusion MINIMAL INTENSITY nomogram - [facility]     Current Outpatient Medications on File Prior to Encounter   Medication Sig    albuterol (PROVENTIL/VENTOLIN HFA) 90 mcg/actuation inhaler 2 puffs every 4 (four) hours as needed.    ALPRAZolam (XANAX) 2 MG Tab Take 2 mg by mouth 3 (three) times daily.    aspirin (ECOTRIN) 81 MG EC tablet Take 81 mg by mouth once daily.    ramelteon (ROZEREM) 8 mg tablet Take 8 mg by mouth nightly as needed.    rosuvastatin (CRESTOR) 20 MG tablet Take 20 mg by mouth every evening.    tiZANidine (ZANAFLEX) 4 MG tablet Take 4 mg by mouth 3 (three) times daily.    TRELEGY ELLIPTA 100-62.5-25 mcg DsDv      Family History       Problem  Relation (Age of Onset)    No Known Problems Mother, Father          Tobacco Use    Smoking status: Every Day     Current packs/day: 0.50     Average packs/day: 0.5 packs/day for 42.4 years (21.2 ttl pk-yrs)     Types: Cigarettes     Start date: 1983    Smokeless tobacco: Never    Tobacco comments:     3 cigarettes a day.    Substance and Sexual Activity    Alcohol use: Not Currently    Drug use: Yes     Types: Marijuana     Comment: Patient taking marijuana for pain    Sexual activity: Not Currently     Review of Systems   Eyes:  Positive for visual disturbance.   Neurological:  Positive for weakness (chronic LUE weakness) and numbness (chronic neuropathy). Negative for dizziness, tremors, seizures, syncope, facial asymmetry, speech difficulty, light-headedness and headaches.   Psychiatric/Behavioral:  Negative for agitation, behavioral problems and confusion.      Objective:     Vital Signs (Most Recent):  Temp: 98.1 °F (36.7 °C) (05/16/25 1534)  Pulse: 78 (05/16/25 1534)  Resp: 16 (05/16/25 1534)  BP: (!) 150/72 (05/16/25 1534)  SpO2: 98 % (05/16/25 1534) Vital Signs (24h Range):  Temp:  [97.9 °F (36.6 °C)-98.2 °F (36.8 °C)] 98.1 °F (36.7 °C)  Pulse:  [66-78] 78  Resp:  [16-18] 16  SpO2:  [96 %-100 %] 98 %  BP: ()/(64-95) 150/72     Weight: 54.4 kg (120 lb)  Body mass index is 20.6 kg/m².     Physical Exam  Constitutional:       General: She is not in acute distress.     Appearance: Normal appearance. She is normal weight. She is not ill-appearing, toxic-appearing or diaphoretic.   HENT:      Head: Normocephalic and atraumatic.      Nose: Nose normal.      Mouth/Throat:      Mouth: Mucous membranes are moist.   Eyes:      General: Visual field deficit (left eye complete hemianopia) present.      Extraocular Movements: Extraocular movements intact.      Pupils: Pupils are equal, round, and reactive to light.      Comments: Left eye vision loss, able to see shadows centrally, appears to have complete  "hemianopia peripherally   Right eye with "tadpole" floaters present per patient; blurry vision present    Pulmonary:      Effort: Pulmonary effort is normal. No respiratory distress.   Musculoskeletal:         General: Normal range of motion.      Right lower leg: No edema.      Left lower leg: No edema.   Skin:     General: Skin is warm.      Capillary Refill: Capillary refill takes less than 2 seconds.      Findings: No lesion.   Neurological:      Mental Status: She is alert and oriented to person, place, and time. Mental status is at baseline.      GCS: GCS eye subscore is 4. GCS verbal subscore is 5. GCS motor subscore is 6.      Cranial Nerves: No cranial nerve deficit, dysarthria or facial asymmetry.      Sensory: No sensory deficit.      Motor: No weakness, tremor, atrophy, abnormal muscle tone, seizure activity or pronator drift.   Psychiatric:         Mood and Affect: Mood normal.         Behavior: Behavior normal.          NEUROLOGICAL EXAMINATION:     MENTAL STATUS   Oriented to person, place, and time.     CRANIAL NERVES     CN III, IV, VI   Pupils are equal, round, and reactive to light.      Significant Labs: CBC:   Recent Labs   Lab 05/16/25  1113   WBC 7.63   HGB 13.4   HCT 38.7        CMP:   Recent Labs   Lab 05/16/25  1113   GLU 94      K 4.3      CO2 23   BUN 11.3   CREATININE 0.68   CALCIUM 10.1   PROT 8.4*   ALBUMIN 4.3   BILITOT 0.4   ALKPHOS 77   AST 17   ALT 10     All pertinent lab results from the past 24 hours have been reviewed.    Significant Imaging: I have reviewed all pertinent imaging results/findings within the past 24 hours.  " normal... Well appearing, awake, alert, oriented to person, place, time/situation and in mild distress.

## 2025-05-16 NOTE — ED NOTES
Pt presents to our ED from [facility] as a transfer for neurovascular services; c/o blurred vision, dizziness, nausea x3 days; pt endorses floaters to left eye;  given and heparin gtt initiated @ 12u/kg/hr @ 1420 from [facility] facility

## 2025-05-16 NOTE — ED PROVIDER NOTES
Encounter Date: 5/16/2025    SCRIBE #1 NOTE: I, Khanh Ohara, am scribing for, and in the presence of,  Marge Haro MD. I have scribed the entire note.       History     Chief Complaint   Patient presents with    Transfer     Arrives via [facility] from [facility]. Transfer for carotid stenosis. On heparin gtt on arrival.      60 year old male with a hx of COPD and CAD presents to the ED as a transfer from [facility] via EMS for vascular surgery. Pt has been unable to see out of her left eye for the last few days. Pt was seen at [facility] today and was told she has carotid stenosis. Pt was transferred here for higher level of care. Pt states she does not want TPA.     The history is provided by the patient. No  was used.     Review of patient's allergies indicates:   Allergen Reactions    Buspirone     Clonazepam     Codeine Hives    Cyclobenzaprine     Eszopiclone Swelling    Fentanyl     Gabapentin Swelling    Hydrocodone     Imipramine     Ketamine     Methadone     Morphine Swelling    Oxycodone-acetaminophen Swelling    Pregabalin Swelling    Quetiapine     Sulfanilamide     Trazodone     Venlafaxine     Zolpidem Swelling    Amitriptyline     Brexpiprazole     Duloxetine     Flonase [fluticasone] Other (See Comments)    Fluoxetine Other (See Comments)    Methocarbamol     Oxycodone-aspirin     Propoxyphene      Past Medical History:   Diagnosis Date    Arthritis     COPD (chronic obstructive pulmonary disease)     Coronary artery disease     Depression     Histoplasmosis 2018    Immune deficiency disorder     Mental disorder     Neuropathy      Past Surgical History:   Procedure Laterality Date    CHOLECYSTECTOMY      lobedectomy Left     SPINE SURGERY  2011     Family History   Problem Relation Name Age of Onset    No Known Problems Mother      No Known Problems Father       Social History[1]  Review of Systems   Eyes:  Positive for visual disturbance (left eye).   Respiratory:  Negative for chest tightness and  shortness of breath.    Cardiovascular:  Negative for chest pain.   Gastrointestinal:  Negative for abdominal pain, nausea and vomiting.   Neurological:  Negative for light-headedness and headaches.       Physical Exam     Initial Vitals [05/16/25 1534]   BP Pulse Resp Temp SpO2   (!) 150/72 78 16 98.1 °F (36.7 °C) 98 %      MAP       --         Physical Exam    Nursing note and vitals reviewed.  Constitutional: No distress.   HENT:   Head: Normocephalic and atraumatic.   Eyes: Conjunctivae and EOM are normal. Pupils are equal, round, and reactive to light.   Can see shadows out of left eye   Neck: Trachea normal. Neck supple.   Normal range of motion.  Cardiovascular:  Normal rate and regular rhythm.           No murmur heard.  Pulmonary/Chest: Breath sounds normal. No respiratory distress. She exhibits no tenderness.   Abdominal: Abdomen is soft. Bowel sounds are normal. There is no abdominal tenderness.   Musculoskeletal:         General: Normal range of motion.      Cervical back: Normal range of motion and neck supple.      Lumbar back: Normal. Normal range of motion.     Neurological: She is alert and oriented to person, place, and time. She has normal strength. No cranial nerve deficit or sensory deficit.   Skin: Skin is warm and dry.   Psychiatric: She has a normal mood and affect.         ED Course   Critical Care    Date/Time: 5/16/2025 4:48 PM    Performed by: Marge Haro MD  Authorized by: Marge Haro MD  Direct patient critical care time: 35 minutes  Total critical care time (exclusive of procedural time) : 35 minutes  Critical care was necessary to treat or prevent imminent or life-threatening deterioration of the following conditions: CNS failure or compromise.  Critical care was time spent personally by me on the following activities: development of treatment plan with patient or surrogate, discussions with consultants, evaluation of patient's response to treatment, examination of  patient, obtaining history from patient or surrogate, ordering and performing treatments and interventions, ordering and review of laboratory studies, ordering and review of radiographic studies, re-evaluation of patient's condition, pulse oximetry and review of old charts.  Subsequent provider of critical care: I assumed direction of critical care for this patient from another provider of my specialty.        Labs Reviewed   APTT   HEMOGLOBIN A1C   LIPID PANEL   TSH          Imaging Results    None          Medications   heparin 25,000 units in dextrose 5% 250 ml (100 units/mL) infusion MINIMAL INTENSITY nomogram - LAF (12 Units/kg/hr × 54.4 kg Intravenous New Bag 5/16/25 1618)   0.9% NaCl infusion (has no administration in time range)     Medical Decision Making  The differential diagnosis includes, but is not limited to, carotid stenosis or CVA.   Cbc, cmp, coags, mri ordered  Consutled interventional neuro and vascular, admit to hospitalist    Problems Addressed:  Benign essential HTN: chronic illness or injury  Left carotid stenosis: acute illness or injury that poses a threat to life or bodily functions  Vision loss of left eye: acute illness or injury that poses a threat to life or bodily functions    Amount and/or Complexity of Data Reviewed  External Data Reviewed: labs, radiology and notes.     Details: From sending faciltiy  Labs: ordered.  Radiology: ordered.    Risk  OTC drugs.  Prescription drug management.  Drug therapy requiring intensive monitoring for toxicity.  Decision regarding hospitalization.    Critical Care  Total time providing critical care: 35 minutes      Additional MDM:     NIH Stroke Scale:   Interval = baseline (upon arrival/admit)  Level of consciousness = 0 - alert  LOC questions = 0 - answers both correctly  LOC commands = 0 - performs both correctly  Best gaze = 0 - normal  Visual = 2 - complete hemianopia  Facial palsy = 0 - normal  Motor left arm =  0 - no drift  Motor right arm  =  0 - no drift  Motor left leg = 0 - no drift  Motor right leg =  0 - no drift  Limb ataxia = 0 - absent  Sensory = 0 - normal  Best language = 0 - no aphasia  Dysarthria = 0 - normal articulation  Extinction and inattention = 0 - no neglect  NIH Stroke Scale Total = 2           Scribe Attestation:   Scribe #1: I performed the above scribed service and the documentation accurately describes the services I performed. I attest to the accuracy of the note.  Comments: Attending:   Physician Attestation Statement for Scribe #1: Marge SANTAMARIA MD, personally performed the services described in this documentation. All medical record entries made by the scribe were at my direction and in my presence.  I have reviewed the chart and agree that the record reflects my personal performance and is accurate and complete.        Attending Attestation:           Physician Attestation for Scribe:  Physician Attestation Statement for Scribe #1: Tahir SANTAMARIA Brooke R, MD, reviewed documentation, as scribed by Khanh Ohara in my presence, and it is both accurate and complete.             ED Course as of 05/16/25 1659   Fri May 16, 2025   1602 Notified dr lezama after discussion with dr de jesus. Requests mri be ordered as well [BS]   1623 Hospitalist consutled [BS]      ED Course User Index  [BS] Marge Haro MD                           Clinical Impression:  Final diagnoses:  [I65.22] Left carotid stenosis (Primary)  [H54.62] Vision loss of left eye  [I10] Benign essential HTN          ED Disposition Condition    Admit                     [1]   Social History  Tobacco Use    Smoking status: Every Day     Current packs/day: 0.50     Average packs/day: 0.5 packs/day for 42.4 years (21.2 ttl pk-yrs)     Types: Cigarettes     Start date: 1983    Smokeless tobacco: Never    Tobacco comments:     3 cigarettes a day.    Substance Use Topics    Alcohol use: Not Currently    Drug use: Yes     Types: Marijuana     Comment: Patient  taking marijuana for pain        Marge Haro MD  05/16/25 [id number]

## 2025-05-17 LAB
ABORH RETYPE: NORMAL
APTT PPP: 51.7 SECONDS (ref 23.2–33.7)
APTT PPP: 52.1 SECONDS (ref 23.2–33.7)
APTT PPP: 83.1 SECONDS (ref 23.2–33.7)
BASOPHILS # BLD AUTO: 0.03 X10(3)/MCL
BASOPHILS NFR BLD AUTO: 0.5 %
EOSINOPHIL # BLD AUTO: 0.06 X10(3)/MCL (ref 0–0.9)
EOSINOPHIL NFR BLD AUTO: 0.9 %
ERYTHROCYTE [DISTWIDTH] IN BLOOD BY AUTOMATED COUNT: 13.2 % (ref 11.5–17)
GROUP & RH: NORMAL
HCT VFR BLD AUTO: 35.7 % (ref 37–47)
HGB BLD-MCNC: 11.8 G/DL (ref 12–16)
IMM GRANULOCYTES # BLD AUTO: 0.02 X10(3)/MCL (ref 0–0.04)
IMM GRANULOCYTES NFR BLD AUTO: 0.3 %
INDIRECT COOMBS: NORMAL
LYMPHOCYTES # BLD AUTO: 2.42 X10(3)/MCL (ref 0.6–4.6)
LYMPHOCYTES NFR BLD AUTO: 36.8 %
MCH RBC QN AUTO: 32.8 PG (ref 27–31)
MCHC RBC AUTO-ENTMCNC: 33.1 G/DL (ref 33–36)
MCV RBC AUTO: 99.2 FL (ref 80–94)
MONOCYTES # BLD AUTO: 0.48 X10(3)/MCL (ref 0.1–1.3)
MONOCYTES NFR BLD AUTO: 7.3 %
NEUTROPHILS # BLD AUTO: 3.57 X10(3)/MCL (ref 2.1–9.2)
NEUTROPHILS NFR BLD AUTO: 54.2 %
NRBC BLD AUTO-RTO: 0 %
PLATELET # BLD AUTO: 220 X10(3)/MCL (ref 130–400)
PMV BLD AUTO: 9.3 FL (ref 7.4–10.4)
RBC # BLD AUTO: 3.6 X10(6)/MCL (ref 4.2–5.4)
SPECIMEN OUTDATE: NORMAL
WBC # BLD AUTO: 6.58 X10(3)/MCL (ref 4.5–11.5)

## 2025-05-17 PROCEDURE — 25000003 PHARM REV CODE 250: Performed by: NURSE PRACTITIONER

## 2025-05-17 PROCEDURE — 36415 COLL VENOUS BLD VENIPUNCTURE: CPT | Performed by: INTERNAL MEDICINE

## 2025-05-17 PROCEDURE — 86900 BLOOD TYPING SEROLOGIC ABO: CPT | Performed by: INTERNAL MEDICINE

## 2025-05-17 PROCEDURE — 36415 COLL VENOUS BLD VENIPUNCTURE: CPT | Performed by: STUDENT IN AN ORGANIZED HEALTH CARE EDUCATION/TRAINING PROGRAM

## 2025-05-17 PROCEDURE — 99223 1ST HOSP IP/OBS HIGH 75: CPT | Mod: 57,,, | Performed by: SURGERY

## 2025-05-17 PROCEDURE — 97161 PT EVAL LOW COMPLEX 20 MIN: CPT

## 2025-05-17 PROCEDURE — 63600175 PHARM REV CODE 636 W HCPCS: Performed by: STUDENT IN AN ORGANIZED HEALTH CARE EDUCATION/TRAINING PROGRAM

## 2025-05-17 PROCEDURE — 85730 THROMBOPLASTIN TIME PARTIAL: CPT | Performed by: INTERNAL MEDICINE

## 2025-05-17 PROCEDURE — 25000003 PHARM REV CODE 250: Performed by: STUDENT IN AN ORGANIZED HEALTH CARE EDUCATION/TRAINING PROGRAM

## 2025-05-17 PROCEDURE — 21400001 HC TELEMETRY ROOM

## 2025-05-17 PROCEDURE — 85025 COMPLETE CBC W/AUTO DIFF WBC: CPT | Performed by: STUDENT IN AN ORGANIZED HEALTH CARE EDUCATION/TRAINING PROGRAM

## 2025-05-17 RX ORDER — CEFAZOLIN SODIUM 1 G/3ML
1 INJECTION, POWDER, FOR SOLUTION INTRAMUSCULAR; INTRAVENOUS
Status: DISCONTINUED | OUTPATIENT
Start: 2025-05-17 | End: 2025-05-19 | Stop reason: HOSPADM

## 2025-05-17 RX ORDER — ALPRAZOLAM 0.5 MG/1
2 TABLET ORAL ONCE
Status: COMPLETED | OUTPATIENT
Start: 2025-05-17 | End: 2025-05-17

## 2025-05-17 RX ADMIN — ATORVASTATIN CALCIUM 80 MG: 40 TABLET, FILM COATED ORAL at 08:05

## 2025-05-17 RX ADMIN — HEPARIN SODIUM 15 UNITS/KG/HR: 10000 INJECTION, SOLUTION INTRAVENOUS at 07:05

## 2025-05-17 RX ADMIN — ALPRAZOLAM 2 MG: 0.5 TABLET ORAL at 02:05

## 2025-05-17 RX ADMIN — HEPARIN SODIUM 15 UNITS/KG/HR: 10000 INJECTION, SOLUTION INTRAVENOUS at 04:05

## 2025-05-17 RX ADMIN — SODIUM CHLORIDE: 9 INJECTION, SOLUTION INTRAVENOUS at 07:05

## 2025-05-17 RX ADMIN — SODIUM CHLORIDE: 9 INJECTION, SOLUTION INTRAVENOUS at 08:05
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
## 2025-05-17 NOTE — PROGRESS NOTES
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 after being referred there by her ophthalmologist for concern of TIA.  A CTA at [facility] showed left internal carotid artery stenosis and she was transferred to our facility for higher level of care.     At baseline she lives with her  and is independent.    Interval Hx:   Pt seen and examined , nad,States amaurosis symptoms improved since yesterday. MRI results reviewed. Small volume area of ischemia on left. Cont heparin gtt for now due to thrombotic appearing carotid lesion. Cont aspirin and statin. Plan for left CEA tomorrow. NPO p MN.     Case was discussed with patient's nurse and  on the floor.    Objective/physical exam:  General: In no acute distress, afebrile  Chest: Clear to auscultation bilaterally  Heart: RRR, +S1, S2, no appreciable murmur  Abdomen: Soft, nontender, BS +  MSK: Warm, no lower extremity edema, no clubbing or cyanosis  Neurologic: Alert and oriented x4, Cranial nerve II-XII intact,: Weakness (there is weakness noted in the left upper and left lower extremity) present.     VITAL SIGNS: 24 HRS MIN & MAX LAST   Temp  Min: 97.6 °F (36.4 °C)  Max: 98.2 °F (36.8 °C) 97.6 °F (36.4 °C)   BP  Min: 97/64  Max: 173/81 113/72   Pulse  Min: 66  Max: 88  69   Resp  Min: 14  Max: 18 18   SpO2  Min: 93 %  Max: 100 % 97 %     I have reviewed the following labs:  Recent Labs   Lab 05/16/25  1113 05/17/25  0414   WBC 7.63 6.58   RBC 3.98* 3.60*   HGB 13.4 11.8*   HCT 38.7 35.7*   MCV 97.2* 99.2*   MCH 33.7* 32.8*   MCHC 34.6 33.1   RDW 12.8 13.2    220   MPV 9.7 9.3     Recent Labs   Lab 05/16/25  1113      K 4.3      CO2 23   BUN 11.3   CREATININE 0.68   GLU 94   CALCIUM 10.1    ALBUMIN 4.3   PROT 8.4*   ALKPHOS 77   ALT 10   AST 17   BILITOT 0.4     Microbiology Results (last 7 days)       ** No results found for the last 168 hours. **             See below for Radiology    Assessment/Plan:  LICA Stenosis  -CTA head and neck showed dense calcified plaque at the left carotid bulb and proximal internal carotid artery with high-grade close to 99% stenosis  -continue heparin GTT   -Ronni Hernandez and Rema evaluated the patient overnight  -per patient vascular surgery states she needs further imaging and likely planning for surgery on Sunday  -MRI : A small area of restricted diffusion which is dark on ADC map and bright on FLAIR consistent with an acute infarct involving the left posterior cerebral artery territory is seen in the left parasagittal occipital lobe     Ischemic CVA  acute infarct involving the left posterior cerebral artery territory.  Cont heparin gtt for now due to thrombotic appearing carotid lesion. Cont aspirin and statin. Per vascular surg. 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 I have reviewed or attempted to review medical records based upon their availability    All of the patient's questions have been  addressed and answered. Patient's is agreeable to the above stated plan. I will continue to monitor closely and make adjustments to medical management as needed.    Portions of this note dictated using EMR integrated  voice recognition software, and may be subject to voice recognition errors not corrected at proofreading. Please contact writer for clarification if needed.   _____________________________________________________________________    Malnutrition Status:  Nutrition consulted. Most recent weight and BMI monitored-     Measurements:  Wt Readings from Last 1 Encounters:   05/16/25 55.5 kg (122 lb 5.7 oz)   Body mass index is 21 kg/m².    Patient has been screened and assessed by RD.    Malnutrition Type:  Context:    Level:      Malnutrition Characteristic Summary:       Interventions/Recommendations (treatment strategy):        Scheduled Med:   atorvastatin  80 mg Oral Daily      Continuous Infusions:   0.9% NaCl   Intravenous Continuous 100 mL/hr at 05/16/25 1708 New Bag at 05/16/25 1708    heparin (porcine) in D5W  0-40 Units/kg/hr Intravenous Continuous 7.6 mL/hr at 05/16/25 2147 14 Units/kg/hr at 05/16/25 2147      PRN Meds:    Current Facility-Administered Medications:     acetaminophen, 650 mg, Oral, Q4H PRN    albuterol-ipratropium, 3 mL, Nebulization, Q6H PRN    aluminum-magnesium hydroxide-simethicone, 30 mL, Oral, QID PRN    bisacodyL, 10 mg, Rectal, Daily PRN    ceFAZolin (Ancef) IV (PEDS and ADULTS), 1 g, Intravenous, On Call Procedure    dextrose 50%, 12.5 g, Intravenous, PRN    dextrose 50%, 25 g, Intravenous, PRN    glucagon (human recombinant), 1 mg, Intramuscular, PRN    glucose, 16 g, Oral, PRN    glucose, 24 g, Oral, PRN    labetalol, 10 mg, Intravenous, Q4H PRN    melatonin, 9 mg, Oral, Nightly PRN    naloxone, 0.02 mg, Intravenous, PRN    nicotine, 1 patch, Transdermal, Daily PRN    ondansetron, 8 mg, Oral, Q8H PRN    ondansetron, 4 mg, Intravenous, Q8H PRN    polyethylene glycol, 17 g, Oral, TID PRN    senna-docusate, 1 tablet, Oral, BID PRN    simethicone, 1 tablet, Oral, QID PRN    sodium chloride 0.9%, 10 mL, Intravenous, PRN     Radiology:  I have personally reviewed the following imaging and  agree with the radiologist.     MRI Brain Without Contrast  START OF REPORT:  Technique: Multiplanar, multisequence magnetic resonance imaging of the brain was performed without intravenous contrast.    Comparison: Correlation is with CT study dated2025-05-16 13:01:53.    Clinical history: Transfer for carotid stenosis. On heparin gtt on arrival.    Findings:  Hemorrhage: No acute intracranial hemorrhage is identified.  Stroke: A small area of restricted diffusion which is dark on ADC map and bright on FLAIR consistent with an acute infarct involving the left posterior cerebral artery territory is seen in the left parasagittal occipital lobe.  Extra axial spaces: The ventricles sulci and basal cisterns are within normal limits.  Cerebral, cerebellar, and brainstem parenchyma: Mild scattered periventricular and subcortical white matter signal abnormalities are seen. The main consideration is small vessel ischemic changes in a patient of this age.  Visualized paranasal sinuses: Normal.  Visualized orbits: The visualized orbits appear unremarkable.  Sella and skull base: Normal.  Temporal bones and mastoids: Within normal limits.    Impression:  1. A small area of restricted diffusion which is dark on ADC map and bright on FLAIR consistent with an acute infarct involving the left posterior cerebral artery territory is seen in the left parasagittal occipital lobe.  2. Details and other findings as discussed above.      Akbar Berg MD  Department of Hospital Medicine   [facility]   05/17/2025
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
## 2025-05-17 NOTE — H&P
[facility]  Emergency Dept  [facility] MEDICINE - H&P ADMISSION NOTE      Patient Name: Ashlie Chavez  MRN: [id number]  Patient Class: IP- Inpatient   Admission Date: 5/16/2025   Admitting Physician: NAHED Service   Attending Physician: Thairy G Reyes, DO  Primary Care Provider: Charlotte Islas MD  Face-to-Face encounter date: 05/16/2025        CHIEF COMPLAINT     Chief Complaint   Patient presents with    Transfer     Arrives via [facility] from [facility]. Transfer for carotid stenosis. On heparin gtt on arrival.        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  A CTA at [facility] showed left internal carotid artery stenosis and she was transferred to our facility for higher level of care.    At baseline she lives with her  and is independent.  PAST MEDICAL HISTORY     Past Medical History:   Diagnosis Date    Arthritis     COPD (chronic obstructive pulmonary disease)     Coronary artery disease     Depression     Histoplasmosis 2018    Immune deficiency disorder     Mental disorder     Neuropathy        PAST SURGICAL HISTORY     Past Surgical History:   Procedure Laterality Date    CHOLECYSTECTOMY      lobedectomy Left     SPINE SURGERY  2011       FAMILY HISTORY   Reviewed and noncontributory to this case    SOCIAL HISTORY   Social History[1]    HOME MEDICATIONS     Prior to Admission medications    Medication Sig Start Date End Date Taking? Authorizing Provider   albuterol (PROVENTIL/VENTOLIN HFA) 90 mcg/actuation inhaler 2 puffs every 4 (four) hours as needed. 8/15/24   Provider, Historical   ALPRAZolam (XANAX) 2 MG Tab Take 2 mg by mouth 3 (three) times daily. 8/13/24   Provider, Historical   aspirin (ECOTRIN) 81 MG EC tablet Take 81 mg by mouth once daily.    Provider,  Historical   ramelteon (ROZEREM) 8 mg tablet Take 8 mg by mouth nightly as needed. 9/10/24   Provider, Historical   rosuvastatin (CRESTOR) 20 MG tablet Take 20 mg by mouth every evening. 9/3/24   Provider, Historical   tiZANidine (ZANAFLEX) 4 MG tablet Take 4 mg by mouth 3 (three) times daily. 7/23/24   Provider, Historical   TRELEGY ELLIPTA 100-62.5-25 mcg DsDv  7/1/24   Provider, Historical       ALLERGIES   Buspirone, Clonazepam, Codeine, Cyclobenzaprine, Eszopiclone, Fentanyl, Gabapentin, Hydrocodone, Imipramine, Ketamine, Methadone, Morphine, Oxycodone-acetaminophen, Pregabalin, Quetiapine, Sulfanilamide, Trazodone, Venlafaxine, Zolpidem, Amitriptyline, Brexpiprazole, Duloxetine, Flonase [fluticasone], Fluoxetine, Methocarbamol, Oxycodone-aspirin, and Propoxyphene    REVIEW OF SYSTEMS   Except as documented above, all other systems reviewed and negative    PHYSICAL EXAM     Vitals:    05/16/25 1734   BP: (!) 156/91   Pulse: 76   Resp: 16   Temp:       General:  In no acute distress, resting comfortably  Head and neck:  Atraumatic, normocephalic, moist mucous membranes, supple neck  Chest:  Clear to auscultation bilaterally  Heart:  S1, S2, no appreciable murmur  Abdomen:  Soft, nontender, BS +  MSK:  Warm, no lower extremity edema, no clubbing or cyanosis  Neuro:  Alert and oriented x4, moving all extremities with good strength  Integumentary:  No obvious skin rash  Psychiatry:  Appropriate mood and affect  ASSESSMENT AND PLAN   LICA Stenosis  -CTA head and neck showed dense calcified plaque at the left carotid bulb and proximal internal carotid artery with high-grade close to 99% stenosis  -continue heparin GTT   -Ronni Hernandez and Rema evaluated the patient overnight  -per patient vascular surgery states she needs further imaging and likely planning for surgery on Sunday  -MRI is pending    Tobacco dependence  -half pack per day, cessation has been advised    PMH of emphysema, lobectomy secondary to  histoplasmosis (she owned parrots), CAD (?), marijuana use    Critical care time greater than 35 minutes for LICA stenosis requiring heparin GTT    DVT prophylaxis:  Heparin GTT  Screening for Social Drivers for health:  Patient screened for food insecurity, housing instability, transportation needs, utility difficulties, and interpersonal safety (select all that apply as identified as concern)  []Housing or Food  []Transportation Needs  []Utility Difficulties  []Interpersonal safety  [x]None  __________________________________________________________________  LABS/MICRO/MEDS/DIAGNOSTICS       LABS  Recent Labs     05/16/25  1113      K 4.3   CO2 23   BUN 11.3   CREATININE 0.68   CALCIUM 10.1   ALKPHOS 77   AST 17   ALT 10   ALBUMIN 4.3     Recent Labs     05/16/25  1113   WBC 7.63   RBC 3.98*   HCT 38.7   MCV 97.2*          MICROBIOLOGY  Microbiology Results (last 7 days)       ** No results found for the last 168 hours. **            MEDICATIONS     INFUSIONS   0.9% NaCl   Intravenous Continuous 100 mL/hr at 05/16/25 1708 New Bag at 05/16/25 1708    heparin (porcine) in D5W  0-40 Units/kg/hr Intravenous Continuous 6.5 mL/hr at 05/16/25 1618 12 Units/kg/hr at 05/16/25 1618       DIAGNOSTIC TESTS  MRI Brain Without Contrast    (Results Pending)          Patient information was obtained from patient, patient's family, past medical records and ER records.   All diagnosis and differential diagnosis have been reviewed; assessment and plan has been documented. I have personally reviewed the labs and test results that are presently available; I have reviewed the patients medication list. I have reviewed the consulting providers response and recommendations. I have reviewed or attempted to review medical records based upon their availability.  All of the patient's questions have been addressed and answered. Patient's is agreeable to the above stated plan. I will continue to monitor closely and make adjustments  to medical management as needed.  This note was created using [employer] voice recognition software that occasionally misinterpreted phrases or words.  Please contact me if any questions may rise regarding documentation to clarify verbiage.        Thairy G Reyes, DO   Internal Medicine  Department of [facility] Medicine  [facility] - Emergency Dept         [1]   Social History  Socioeconomic History    Marital status:    Tobacco Use    Smoking status: Every Day     Current packs/day: 0.50     Average packs/day: 0.5 packs/day for 42.4 years (21.2 ttl pk-yrs)     Types: Cigarettes     Start date: 1983    Smokeless tobacco: Never    Tobacco comments:     3 cigarettes a day.    Substance and Sexual Activity    Alcohol use: Not Currently    Drug use: Yes     Types: Marijuana     Comment: Patient taking marijuana for pain    Sexual activity: Not Currently

## 2025-05-17 NOTE — PROGRESS NOTES
[facility] Vascular Surgery  Progress Note      No acute issues overnight.  States amaurosis symptoms improved since yesterday. MRI results reviewed.  Small volume area of ischemia on left.  Cont heparin gtt for now due to thrombotic appearing carotid lesion. Cont aspirin and statin.  Plan for left CEA tomorrow.  NPO alex GALLEGOS.

## 2025-05-18 ENCOUNTER — ANESTHESIA (OUTPATIENT)
Dept: SURGERY | Facility: HOSPITAL | Age: 60
DRG: 037 | End: 2025-05-18
Payer: MEDICAID

## 2025-05-18 ENCOUNTER — ANESTHESIA EVENT (OUTPATIENT)
Dept: SURGERY | Facility: HOSPITAL | Age: 60
DRG: 037 | End: 2025-05-18
Payer: MEDICAID

## 2025-05-18 LAB
ANION GAP SERPL CALC-SCNC: 6 MEQ/L
APTT PPP: 43.7 SECONDS (ref 23.2–33.7)
BASOPHILS # BLD AUTO: 0.03 X10(3)/MCL
BASOPHILS NFR BLD AUTO: 0.5 %
BUN SERPL-MCNC: 5.7 MG/DL (ref 9.8–20.1)
CALCIUM SERPL-MCNC: 8.5 MG/DL (ref 8.4–10.2)
CHLORIDE SERPL-SCNC: 110 MMOL/L (ref 98–107)
CO2 SERPL-SCNC: 23 MMOL/L (ref 23–31)
CREAT SERPL-MCNC: 0.67 MG/DL (ref 0.55–1.02)
CREAT/UREA NIT SERPL: 9
EOSINOPHIL # BLD AUTO: 0.07 X10(3)/MCL (ref 0–0.9)
EOSINOPHIL NFR BLD AUTO: 1.2 %
ERYTHROCYTE [DISTWIDTH] IN BLOOD BY AUTOMATED COUNT: 13.2 % (ref 11.5–17)
GFR SERPLBLD CREATININE-BSD FMLA CKD-EPI: >60 ML/MIN/1.73/M2
GLUCOSE SERPL-MCNC: 86 MG/DL (ref 82–115)
HCT VFR BLD AUTO: 36.7 % (ref 37–47)
HGB BLD-MCNC: 12 G/DL (ref 12–16)
IMM GRANULOCYTES # BLD AUTO: 0.01 X10(3)/MCL (ref 0–0.04)
IMM GRANULOCYTES NFR BLD AUTO: 0.2 %
LYMPHOCYTES # BLD AUTO: 2.48 X10(3)/MCL (ref 0.6–4.6)
LYMPHOCYTES NFR BLD AUTO: 43 %
MCH RBC QN AUTO: 32.9 PG (ref 27–31)
MCHC RBC AUTO-ENTMCNC: 32.7 G/DL (ref 33–36)
MCV RBC AUTO: 100.5 FL (ref 80–94)
MONOCYTES # BLD AUTO: 0.46 X10(3)/MCL (ref 0.1–1.3)
MONOCYTES NFR BLD AUTO: 8 %
NEUTROPHILS # BLD AUTO: 2.72 X10(3)/MCL (ref 2.1–9.2)
NEUTROPHILS NFR BLD AUTO: 47.1 %
NRBC BLD AUTO-RTO: 0 %
PLATELET # BLD AUTO: 224 X10(3)/MCL (ref 130–400)
PMV BLD AUTO: 9.6 FL (ref 7.4–10.4)
POTASSIUM SERPL-SCNC: 4.1 MMOL/L (ref 3.5–5.1)
RBC # BLD AUTO: 3.65 X10(6)/MCL (ref 4.2–5.4)
SODIUM SERPL-SCNC: 139 MMOL/L (ref 136–145)
WBC # BLD AUTO: 5.77 X10(3)/MCL (ref 4.5–11.5)

## 2025-05-18 PROCEDURE — 63600175 PHARM REV CODE 636 W HCPCS: Performed by: SURGERY

## 2025-05-18 PROCEDURE — 85025 COMPLETE CBC W/AUTO DIFF WBC: CPT | Performed by: STUDENT IN AN ORGANIZED HEALTH CARE EDUCATION/TRAINING PROGRAM

## 2025-05-18 PROCEDURE — 03CN0ZZ EXTIRPATION OF MATTER FROM LEFT EXTERNAL CAROTID ARTERY, OPEN APPROACH: ICD-10-PCS | Performed by: SURGERY

## 2025-05-18 PROCEDURE — 80048 BASIC METABOLIC PNL TOTAL CA: CPT | Performed by: INTERNAL MEDICINE

## 2025-05-18 PROCEDURE — 71000033 HC RECOVERY, INTIAL HOUR: Performed by: SURGERY

## 2025-05-18 PROCEDURE — 63600175 PHARM REV CODE 636 W HCPCS: Performed by: PHYSICIAN ASSISTANT

## 2025-05-18 PROCEDURE — D9220A PRA ANESTHESIA: Mod: CRNA,,, | Performed by: NURSE ANESTHETIST, CERTIFIED REGISTERED

## 2025-05-18 PROCEDURE — 03UN0KZ SUPPLEMENT LEFT EXTERNAL CAROTID ARTERY WITH NONAUTOLOGOUS TISSUE SUBSTITUTE, OPEN APPROACH: ICD-10-PCS | Performed by: SURGERY

## 2025-05-18 PROCEDURE — 37000008 HC ANESTHESIA 1ST 15 MINUTES: Performed by: SURGERY

## 2025-05-18 PROCEDURE — 94799 UNLISTED PULMONARY SVC/PX: CPT

## 2025-05-18 PROCEDURE — 27000221 HC OXYGEN, UP TO 24 HOURS

## 2025-05-18 PROCEDURE — D9220A PRA ANESTHESIA: Mod: ANES,,, | Performed by: ANESTHESIOLOGY

## 2025-05-18 PROCEDURE — 25000003 PHARM REV CODE 250: Performed by: NURSE PRACTITIONER

## 2025-05-18 PROCEDURE — 71000039 HC RECOVERY, EACH ADD'L HOUR: Performed by: SURGERY

## 2025-05-18 PROCEDURE — 27201423 OPTIME MED/SURG SUP & DEVICES STERILE SUPPLY: Performed by: SURGERY

## 2025-05-18 PROCEDURE — 99900035 HC TECH TIME PER 15 MIN (STAT)

## 2025-05-18 PROCEDURE — 21400001 HC TELEMETRY ROOM

## 2025-05-18 PROCEDURE — C1768 GRAFT, VASCULAR: HCPCS | Performed by: SURGERY

## 2025-05-18 PROCEDURE — 36415 COLL VENOUS BLD VENIPUNCTURE: CPT | Performed by: STUDENT IN AN ORGANIZED HEALTH CARE EDUCATION/TRAINING PROGRAM

## 2025-05-18 PROCEDURE — C9248 INJ, CLEVIDIPINE BUTYRATE: HCPCS | Mod: JZ,TB | Performed by: NURSE ANESTHETIST, CERTIFIED REGISTERED

## 2025-05-18 PROCEDURE — 85730 THROMBOPLASTIN TIME PARTIAL: CPT | Performed by: INTERNAL MEDICINE

## 2025-05-18 PROCEDURE — 25500020 PHARM REV CODE 255: Performed by: SURGERY

## 2025-05-18 PROCEDURE — 63600175 PHARM REV CODE 636 W HCPCS: Mod: JZ,TB | Performed by: NURSE ANESTHETIST, CERTIFIED REGISTERED

## 2025-05-18 PROCEDURE — 37000009 HC ANESTHESIA EA ADD 15 MINS: Performed by: SURGERY

## 2025-05-18 PROCEDURE — 36000707: Performed by: SURGERY

## 2025-05-18 PROCEDURE — 71000015 HC POSTOP RECOV 1ST HR: Performed by: SURGERY

## 2025-05-18 PROCEDURE — 25000003 PHARM REV CODE 250: Performed by: NURSE ANESTHETIST, CERTIFIED REGISTERED

## 2025-05-18 PROCEDURE — 25000242 PHARM REV CODE 250 ALT 637 W/ HCPCS: Performed by: PHYSICIAN ASSISTANT

## 2025-05-18 PROCEDURE — 94640 AIRWAY INHALATION TREATMENT: CPT

## 2025-05-18 PROCEDURE — 36000706: Performed by: SURGERY

## 2025-05-18 DEVICE — PATCH XENOSURE TAPR .8X8CM: Type: IMPLANTABLE DEVICE | Site: CAROTID | Status: FUNCTIONAL

## 2025-05-18 RX ORDER — BUPIVACAINE HYDROCHLORIDE 2.5 MG/ML
INJECTION, SOLUTION EPIDURAL; INFILTRATION; INTRACAUDAL; PERINEURAL
Status: DISCONTINUED | OUTPATIENT
Start: 2025-05-18 | End: 2025-05-18 | Stop reason: HOSPADM

## 2025-05-18 RX ORDER — ONDANSETRON HYDROCHLORIDE 2 MG/ML
4 INJECTION, SOLUTION INTRAVENOUS EVERY 12 HOURS PRN
Status: DISCONTINUED | OUTPATIENT
Start: 2025-05-18 | End: 2025-05-19 | Stop reason: HOSPADM

## 2025-05-18 RX ORDER — PROPOFOL 10 MG/ML
VIAL (ML) INTRAVENOUS
Status: DISCONTINUED | OUTPATIENT
Start: 2025-05-18 | End: 2025-05-18

## 2025-05-18 RX ORDER — EPHEDRINE SULFATE 50 MG/ML
INJECTION, SOLUTION INTRAVENOUS
Status: DISCONTINUED | OUTPATIENT
Start: 2025-05-18 | End: 2025-05-18

## 2025-05-18 RX ORDER — LIDOCAINE HYDROCHLORIDE 10 MG/ML
INJECTION, SOLUTION INFILTRATION; PERINEURAL
Status: DISCONTINUED | OUTPATIENT
Start: 2025-05-18 | End: 2025-05-18 | Stop reason: HOSPADM

## 2025-05-18 RX ORDER — DEXAMETHASONE SODIUM PHOSPHATE 4 MG/ML
INJECTION, SOLUTION INTRA-ARTICULAR; INTRALESIONAL; INTRAMUSCULAR; INTRAVENOUS; SOFT TISSUE
Status: DISCONTINUED | OUTPATIENT
Start: 2025-05-18 | End: 2025-05-18

## 2025-05-18 RX ORDER — HYDRALAZINE HYDROCHLORIDE 20 MG/ML
10 INJECTION INTRAMUSCULAR; INTRAVENOUS
Status: DISCONTINUED | OUTPATIENT
Start: 2025-05-18 | End: 2025-05-19 | Stop reason: HOSPADM

## 2025-05-18 RX ORDER — HEPARIN SOD,PORCINE/0.9 % NACL 1000/500ML
INTRAVENOUS SOLUTION INTRAVENOUS
Status: DISCONTINUED | OUTPATIENT
Start: 2025-05-18 | End: 2025-05-18 | Stop reason: HOSPADM

## 2025-05-18 RX ORDER — MIDAZOLAM HYDROCHLORIDE 1 MG/ML
INJECTION INTRAMUSCULAR; INTRAVENOUS
Status: DISCONTINUED | OUTPATIENT
Start: 2025-05-18 | End: 2025-05-18

## 2025-05-18 RX ORDER — ONDANSETRON HYDROCHLORIDE 2 MG/ML
INJECTION, SOLUTION INTRAVENOUS
Status: DISCONTINUED | OUTPATIENT
Start: 2025-05-18 | End: 2025-05-18

## 2025-05-18 RX ORDER — ACETAMINOPHEN 325 MG/1
650 TABLET ORAL EVERY 4 HOURS PRN
Status: DISCONTINUED | OUTPATIENT
Start: 2025-05-18 | End: 2025-05-19 | Stop reason: HOSPADM

## 2025-05-18 RX ORDER — ALPRAZOLAM 0.5 MG/1
2 TABLET ORAL ONCE
Status: COMPLETED | OUTPATIENT
Start: 2025-05-18 | End: 2025-05-18

## 2025-05-18 RX ORDER — CEFAZOLIN SODIUM 1 G/3ML
INJECTION, POWDER, FOR SOLUTION INTRAMUSCULAR; INTRAVENOUS
Status: DISCONTINUED | OUTPATIENT
Start: 2025-05-18 | End: 2025-05-18 | Stop reason: HOSPADM

## 2025-05-18 RX ORDER — GLYCOPYRROLATE 0.2 MG/ML
INJECTION INTRAMUSCULAR; INTRAVENOUS
Status: DISCONTINUED | OUTPATIENT
Start: 2025-05-18 | End: 2025-05-18

## 2025-05-18 RX ORDER — LIDOCAINE HYDROCHLORIDE 20 MG/ML
INJECTION, SOLUTION EPIDURAL; INFILTRATION; INTRACAUDAL; PERINEURAL
Status: DISCONTINUED | OUTPATIENT
Start: 2025-05-18 | End: 2025-05-18

## 2025-05-18 RX ORDER — FENTANYL CITRATE 50 UG/ML
INJECTION, SOLUTION INTRAMUSCULAR; INTRAVENOUS
Status: DISCONTINUED | OUTPATIENT
Start: 2025-05-18 | End: 2025-05-18

## 2025-05-18 RX ORDER — MUPIROCIN 20 MG/G
OINTMENT TOPICAL 2 TIMES DAILY
Status: DISCONTINUED | OUTPATIENT
Start: 2025-05-18 | End: 2025-05-19 | Stop reason: HOSPADM

## 2025-05-18 RX ORDER — PHENYLEPHRINE HYDROCHLORIDE 10 MG/ML
INJECTION INTRAVENOUS
Status: DISCONTINUED | OUTPATIENT
Start: 2025-05-18 | End: 2025-05-18

## 2025-05-18 RX ORDER — TALC
6 POWDER (GRAM) TOPICAL NIGHTLY PRN
Status: DISCONTINUED | OUTPATIENT
Start: 2025-05-18 | End: 2025-05-19 | Stop reason: HOSPADM

## 2025-05-18 RX ORDER — IOPAMIDOL 612 MG/ML
INJECTION, SOLUTION INTRAVASCULAR
Status: DISCONTINUED | OUTPATIENT
Start: 2025-05-18 | End: 2025-05-18 | Stop reason: HOSPADM

## 2025-05-18 RX ORDER — PROTAMINE SULFATE 10 MG/ML
INJECTION, SOLUTION INTRAVENOUS
Status: DISCONTINUED | OUTPATIENT
Start: 2025-05-18 | End: 2025-05-18

## 2025-05-18 RX ORDER — ROCURONIUM BROMIDE 10 MG/ML
INJECTION, SOLUTION INTRAVENOUS
Status: DISCONTINUED | OUTPATIENT
Start: 2025-05-18 | End: 2025-05-18

## 2025-05-18 RX ORDER — TRAMADOL HYDROCHLORIDE 50 MG/1
50 TABLET, FILM COATED ORAL EVERY 4 HOURS PRN
Status: DISCONTINUED | OUTPATIENT
Start: 2025-05-18 | End: 2025-05-19 | Stop reason: HOSPADM

## 2025-05-18 RX ORDER — HEPARIN SODIUM 1000 [USP'U]/ML
INJECTION, SOLUTION INTRAVENOUS; SUBCUTANEOUS
Status: DISCONTINUED | OUTPATIENT
Start: 2025-05-18 | End: 2025-05-18

## 2025-05-18 RX ADMIN — HEPARIN SODIUM 6000 UNITS: 1000 INJECTION, SOLUTION INTRAVENOUS; SUBCUTANEOUS at 08:05

## 2025-05-18 RX ADMIN — IPRATROPIUM BROMIDE AND ALBUTEROL SULFATE 3 ML: .5; 3 SOLUTION RESPIRATORY (INHALATION) at 08:05

## 2025-05-18 RX ADMIN — CEFAZOLIN 2 G: 330 INJECTION, POWDER, FOR SOLUTION INTRAMUSCULAR; INTRAVENOUS at 07:05

## 2025-05-18 RX ADMIN — PHENYLEPHRINE HYDROCHLORIDE 100 MCG: 10 INJECTION INTRAVENOUS at 07:05

## 2025-05-18 RX ADMIN — LIDOCAINE HYDROCHLORIDE 4 ML: 20 INJECTION, SOLUTION EPIDURAL; INFILTRATION; INTRACAUDAL; PERINEURAL at 07:05

## 2025-05-18 RX ADMIN — HYDRALAZINE HYDROCHLORIDE 10 MG: 20 INJECTION INTRAMUSCULAR; INTRAVENOUS at 11:05

## 2025-05-18 RX ADMIN — CLEVIPIDINE 2 MG/HR: 0.5 EMULSION INTRAVENOUS at 08:05

## 2025-05-18 RX ADMIN — LABETALOL HYDROCHLORIDE 10 MG: 5 INJECTION, SOLUTION INTRAVENOUS at 10:05

## 2025-05-18 RX ADMIN — PROPOFOL 150 MG: 10 INJECTION, EMULSION INTRAVENOUS at 07:05

## 2025-05-18 RX ADMIN — FENTANYL CITRATE 100 MCG: 50 INJECTION, SOLUTION INTRAMUSCULAR; INTRAVENOUS at 07:05

## 2025-05-18 RX ADMIN — MIDAZOLAM HYDROCHLORIDE 2 MG: 1 INJECTION, SOLUTION INTRAMUSCULAR; INTRAVENOUS at 07:05

## 2025-05-18 RX ADMIN — HEPARIN SODIUM 1000 UNITS: 1000 INJECTION, SOLUTION INTRAVENOUS; SUBCUTANEOUS at 08:05

## 2025-05-18 RX ADMIN — ROCURONIUM BROMIDE 60 MG: 10 SOLUTION INTRAVENOUS at 07:05

## 2025-05-18 RX ADMIN — DEXAMETHASONE SODIUM PHOSPHATE 6 MG: 4 INJECTION, SOLUTION INTRA-ARTICULAR; INTRALESIONAL; INTRAMUSCULAR; INTRAVENOUS; SOFT TISSUE at 08:05

## 2025-05-18 RX ADMIN — SUGAMMADEX 189 MG: 100 INJECTION, SOLUTION INTRAVENOUS at 09:05

## 2025-05-18 RX ADMIN — PROTAMINE SULFATE 30 MG: 10 INJECTION, SOLUTION INTRAVENOUS at 09:05

## 2025-05-18 RX ADMIN — FENTANYL CITRATE 50 MCG: 50 INJECTION, SOLUTION INTRAMUSCULAR; INTRAVENOUS at 09:05

## 2025-05-18 RX ADMIN — EPHEDRINE SULFATE 10 MG: 50 INJECTION INTRAVENOUS at 07:05

## 2025-05-18 RX ADMIN — ROCURONIUM BROMIDE 15 MG: 10 SOLUTION INTRAVENOUS at 08:05

## 2025-05-18 RX ADMIN — ALPRAZOLAM 2 MG: 0.5 TABLET ORAL at 09:05

## 2025-05-18 RX ADMIN — PHENYLEPHRINE HYDROCHLORIDE 20 MCG/MIN: 10 INJECTION INTRAVENOUS at 07:05

## 2025-05-18 RX ADMIN — FENTANYL CITRATE 50 MCG: 50 INJECTION, SOLUTION INTRAMUSCULAR; INTRAVENOUS at 08:05

## 2025-05-18 RX ADMIN — SODIUM CHLORIDE, SODIUM GLUCONATE, SODIUM ACETATE, POTASSIUM CHLORIDE AND MAGNESIUM CHLORIDE: 526; 502; 368; 37; 30 INJECTION, SOLUTION INTRAVENOUS at 07:05

## 2025-05-18 RX ADMIN — GLYCOPYRROLATE 0.2 MG: 0.2 INJECTION INTRAMUSCULAR; INTRAVENOUS at 07:05

## 2025-05-18 RX ADMIN — EPHEDRINE SULFATE 5 MG: 50 INJECTION INTRAVENOUS at 08:05

## 2025-05-18 RX ADMIN — ONDANSETRON 4 MG: 2 INJECTION INTRAMUSCULAR; INTRAVENOUS at 08:05

## 2025-05-18 NOTE — PROGRESS NOTES
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  A CTA at [facility] showed left internal carotid artery stenosis and she was transferred to our facility for higher level of care.     At baseline she lives with her  and is independent.    Interval Hx:   Pt seen and examined , nad,States amaurosis symptoms improved since yesterday. MRI results reviewed. Small volume area of ischemia on left. Cont heparin gtt for now due to thrombotic appearing carotid lesion. Cont aspirin and statin. Plan for left CEA today     Case was discussed with patient's nurse and  on the floor.    Objective/physical exam:  General: In no acute distress, afebrile  Chest: Clear to auscultation bilaterally  Heart: RRR, +S1, S2, no appreciable murmur  Abdomen: Soft, nontender, BS +  MSK: Warm, no lower extremity edema, no clubbing or cyanosis  Neurologic: Alert and oriented x4, Cranial nerve II-XII intact,: Weakness (there is weakness noted in the left upper and left lower extremity) present.     VITAL SIGNS: 24 HRS MIN & MAX LAST   Temp  Min: 97.9 °F (36.6 °C)  Max: 98.4 °F (36.9 °C) 98.4 °F (36.9 °C)   BP  Min: 116/73  Max: 181/82 138/77   Pulse  Min: 61  Max: 87  71   Resp  Min: 14  Max: 29 14   SpO2  Min: 95 %  Max: 99 % 95 %     I have reviewed the following labs:  Recent Labs   Lab 05/16/25  1113 05/17/25  0414 05/18/25  0549   WBC 7.63 6.58 5.77   RBC 3.98* 3.60* 3.65*   HGB 13.4 11.8* 12.0   HCT 38.7 35.7* 36.7*   MCV 97.2* 99.2* 100.5*   MCH 33.7* 32.8* 32.9*   MCHC 34.6 33.1 32.7*   RDW 12.8 13.2 13.2    220 224   MPV 9.7 9.3 9.6     Recent Labs   Lab 05/16/25  1113 05/18/25  0549    139   K 4.3  4.1    110*   CO2 23 23   BUN 11.3 5.7*   CREATININE 0.68 0.67   GLU 94 86   CALCIUM 10.1 8.5   ALBUMIN 4.3  --    PROT 8.4*  --    ALKPHOS 77  --    ALT 10  --    AST 17  --    BILITOT 0.4  --      Microbiology Results (last 7 days)       ** No results found for the last 168 hours. **             See below for Radiology    Assessment/Plan:  LICA Stenosis  -CTA head and neck showed dense calcified plaque at the left carotid bulb and proximal internal carotid artery with high-grade close to 99% stenosis  -continue heparin GTT   -Ronni Hernandez and Rema evaluated the patient overnight  -per patient vascular surgery states she needs further imaging and likely planning for surgery today  -MRI : A small area of restricted diffusion which is dark on ADC map and bright on FLAIR consistent with an acute infarct involving the left posterior cerebral artery territory is seen in the left parasagittal occipital lobe   -pt consulted    Ischemic CVA  acute infarct involving the left posterior cerebral artery territory.  Cont heparin gtt for now due to thrombotic appearing carotid lesion. Cont aspirin and statin. Per vascular surg. 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 I have reviewed or attempted to review medical records based upon their availability    All of the patient's questions have been  addressed and answered. Patient's is agreeable to the above stated plan. I  will continue to monitor closely and make adjustments to medical management as needed.    Portions of this note dictated using EMR integrated voice recognition software, and may be subject to voice recognition errors not corrected at proofreading. Please contact writer for clarification if needed.   _____________________________________________________________________    Malnutrition Status:  Nutrition consulted. Most recent weight and BMI monitored-     Measurements:  Wt Readings from Last 1 Encounters:   05/16/25 55.5 kg (122 lb 5.7 oz)   Body mass index is 21 kg/m².    Patient has been screened and assessed by RD.    Malnutrition Type:  Context:    Level:      Malnutrition Characteristic Summary:       Interventions/Recommendations (treatment strategy):        Scheduled Med:   atorvastatin  80 mg Oral Daily    mupirocin   Nasal BID      Continuous Infusions:   0.9% NaCl   Intravenous Continuous 100 mL/hr at 05/17/25 1903 New Bag at 05/17/25 1903      PRN Meds:    Current Facility-Administered Medications:     acetaminophen, 650 mg, Oral, Q4H PRN    albuterol-ipratropium, 3 mL, Nebulization, Q6H PRN    aluminum-magnesium hydroxide-simethicone, 30 mL, Oral, QID PRN    bisacodyL, 10 mg, Rectal, Daily PRN    ceFAZolin (Ancef) IV (PEDS and ADULTS), 1 g, Intravenous, On Call Procedure    dextrose 50%, 12.5 g, Intravenous, PRN    dextrose 50%, 25 g, Intravenous, PRN    glucagon (human recombinant), 1 mg, Intramuscular, PRN    glucose, 16 g, Oral, PRN    glucose, 24 g, Oral, PRN    hydrALAZINE, 10 mg, Intravenous, Q1H PRN    labetalol, 10 mg, Intravenous, Q4H PRN    melatonin, 6 mg, Oral, Nightly PRN    melatonin, 9 mg, Oral, Nightly PRN    naloxone, 0.02 mg, Intravenous, PRN    nicotine, 1 patch, Transdermal, Daily PRN    ondansetron, 8 mg, Oral, Q8H PRN    ondansetron, 4 mg, Intravenous, Q12H PRN    ondansetron, 4 mg, Intravenous, Q12H PRN    polyethylene glycol, 17 g, Oral, TID PRN    senna-docusate, 1 tablet, Oral,  BID PRN    simethicone, 1 tablet, Oral, QID PRN    sodium chloride 0.9%, 10 mL, Intravenous, PRN    traMADoL, 50 mg, Oral, Q4H PRN     Radiology:  I have personally reviewed the following imaging and agree with the radiologist.     Cardiac catheterization  Procedure performed in the Invasive Lab    - See Procedure Log link below for nursing documentation    - See OpNote on Surgeries Tab for physician findings    - See Imaging Tab for radiologist dictation      Akbar Berg MD  Department of Hospital Medicine   [facility]   05/18/2025

## 2025-05-18 NOTE — TRANSFER OF CARE
"Anesthesia Transfer of Care Note    Patient: Ashlie Chavez    Procedure(s) Performed: Procedure(s) (LRB):  ENDARTERECTOMY, CAROTID (Left)    Patient location: PACU    Anesthesia Type: general    Transport from OR: Transported from OR on room air with adequate spontaneous ventilation    Post pain: adequate analgesia    Post assessment: no apparent anesthetic complications    Post vital signs: stable    Level of consciousness: awake and sedated    Nausea/Vomiting: no nausea/vomiting    Complications: none    Transfer of care protocol was followed      Last vitals: Visit Vitals  BP (!) 152/79 (BP Location: Left arm, Patient Position: Sitting)   Pulse 74   Temp 36.7 °C (98 °F)   Resp 18   Ht 5' 4" (1.626 m)   Wt 55.5 kg (122 lb 5.7 oz)   SpO2 96%   BMI 21.00 kg/m²     "

## 2025-05-18 NOTE — OR NURSING
Patient has dermal piercing on left side of face that was unable to be removed prior to procedure start. Libby Gabriel RN explained the risks of having metal jewelry on during the surgical procedure and patient verbalized understanding of the risks. Left sided weakness noted to left upper and left lower extremities by Libby Gabriel RN prior to rolling to the operating room. No deficits noted in patient's face prior to procedure. Neuro assessment performed by Libby Gabriel RN, Dr. Hodgson, and JENNIFER Skelton prior to leaving OR with patient. Patient's post-op weaknesses consistent with pre-op weaknesses. Libby Gabriel RN informed Nevin Perry RN of deficits in report. Libby Gabriel RN handed off patient's dentures to Nevin Perry in handoff report.

## 2025-05-18 NOTE — OP NOTE
[facility]  [place]ative Note        Surgery Date:  05/18/2025     Pre-op Diagnosis:    Stenosis of left carotid artery [I65.22], symptomatic     Post-op Diagnosis:  Same     Procedure(s):  1.  Left carotid endarterectomy with bovine pericardial patch angioplasty   2.  Left carotid completion angiogram    Indication for procedure: Ashlie Chavez is a 60 y.o. female who presented with amaurosis and MRI evidence of stroke with high-grade left ICA stenosis with a thrombotic component present.  She was taken for carotid endarterectomy for stroke risk reduction    Surgeon:  Dallin Hodgson MD    Assistant:  Circulator: Libby Gabriel RN  Physician Assistant: Ruslan Malhotra PA-C  Radiology Technologist: Rory Mims RT  Scrub Person: Vicky Gutierrez     Anesthesia:  General     Findings:  Completion duplex showed good proximal and distal endpoints.  There was a high resistance waveform pattern in the ICA on completion.  Angiography showed a patent repair with a diminutive carotid with flow into the petrous portion of the carotid.  This was consistent with the patient's preoperative CT scan    Complications: None     Estimated Blood Loss:  20 mL         Specimens: Left carotid plaque    Implants:  Implant Name Type Inv. Item Serial No.  Lot No. LRB No. Used Action   PATCH XENOSURE TAPR .8X8CM - SNA  PATCH XENOSURE TAPR .8X8CM NA [facility] [id number] Left 1 Implanted       Drains: None    Procedure in detail: After informed consent was obtained, and all risk benefits were discussed with the patient, they were brought to the operating room and placed supine.  After adequate general endotracheal anesthesia was obtained, the patient was prepped and draped in a standard sterile fashion.  An oblique left neck incision was made and dissection was carried down through the platysma.  Dissection was then continued along the anterior border of the sternocleidomastoid until the jugular  vein was identified.  This was retracted laterally and the facial vein was ligated between ties.  From here the common carotid artery was identified and dissected free from surrounding structures.  It was isolated with a vessel loop.  I then continued dissection superiorly until I was able to fully dissect the internal and external carotid arteries as well.  These were each isolated with a vessel loop as well.  The patient was then systemically heparinized and the heparin was allowed to circulate for 3 minutes.  The internal carotid artery was clamped, followed by the common and external carotid sequentially.  An arteriotomy was made in the common carotid artery and carried up into the internal carotid artery.  From here an endarterectomy was performed.  Eversion endarterectomy was performed of the external carotid artery.  Once I felt there was an adequate distal endpoint, a bovine pericardial patch was sewn into place with a 6-0 Prolene suture.  At the completion of the anastomosis, the internal, external, and common carotid arteries were all backbled into the wound.  From here the external carotid and common carotid artery clamps were released.  This was allowed to flow for about 10 seconds before releasing the internal carotid artery clamp.  From here a completion ultrasound was performed.  This showed a good proximal and distal point.  Doppler flow showed high resistance waveforms.  I did elect to proceed with angiography.  A micropuncture needle was used to access the patch after placement of a pursestring suture.  Angiography was performed.  This showed an intact repair with a diminutive carotid.  The patient had this on a known preoperative CT scan.  This is likely the cause of her high resistance waveforms.  The needle was removed and the suture cinched down.  From here hemostasis was obtained and the wound was closed in layers using a 3-0 Vicryl for the platysma and a 4-0 Monocryl subcuticular for the  skin.  Dermabond was placed.  The patient was awakened and was moving all extremities and brought to recovery in stable condition.      Condition: Good

## 2025-05-18 NOTE — ANESTHESIA PROCEDURE NOTES
Intubation    Date/Time: 5/18/2025 7:32 AM    Performed by: Goran Saunders CRNA  Authorized by: Chino Huston MD    Intubation:     Induction:  Intravenous    Mask Ventilation:  Easy mask    Attempts:  1    Attempted By:  CRNA    Method of Intubation:  Direct    Blade:  Soto 2    Laryngeal View Grade: Grade I - full view of cords      Difficult Airway Encountered?: No      Complications:  None    Airway Device:  Oral endotracheal tube    Airway Device Size:  7.5    Style/Cuff Inflation:  Cuffed    Tube secured:  21    Secured at:  The lips    Placement Verified By:  Capnometry    Complicating Factors:  None    Findings Post-Intubation:  BS equal bilateral

## 2025-05-18 NOTE — ANESTHESIA POSTPROCEDURE EVALUATION
Anesthesia Post Evaluation    Patient: Ashlie Chavez    Procedure(s) Performed: Procedure(s) (LRB):  ENDARTERECTOMY, CAROTID (Left)    Final Anesthesia Type: general      Patient location during evaluation: PACU  Patient participation: Yes- Able to Participate  Level of consciousness: awake and alert  Post-procedure vital signs: reviewed and stable  Pain management: adequate  Airway patency: patent  ORTEGA mitigation strategies: Multimodal analgesia  PONV status at discharge: No PONV  Anesthetic complications: no      Cardiovascular status: blood pressure returned to baseline and hemodynamically stable  Respiratory status: unassisted and spontaneous ventilation  Hydration status: euvolemic  Follow-up not needed.            Vitals Value Taken Time   BP  05/18/25 10:48   Temp  05/18/25 10:48   Pulse  05/18/25 10:48   Resp  05/18/25 10:48   SpO2  05/18/25 10:48         No case tracking events are documented in the log.      Pain/Tiffanie Score: No data recorded

## 2025-05-18 NOTE — PROGRESS NOTES
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  A CTA at [facility] showed left internal carotid artery stenosis and she was transferred to our facility for higher level of care.     At baseline she lives with her  and is independent.    Interval Hx:   Pt seen and examined , nad, States amaurosis symptoms improved since yesterday. MRI results reviewed. Small volume area of ischemia on left. Cont heparin gtt for now due to thrombotic appearing carotid lesion. Cont aspirin and statin. Plan for left CEA today    Case was discussed with patient's nurse and  on the floor.    Objective/physical exam:  General: In no acute distress, afebrile  Chest: Clear to auscultation bilaterally  Heart: RRR, +S1, S2, no appreciable murmur  Abdomen: Soft, nontender, BS +  MSK: Warm, no lower extremity edema, no clubbing or cyanosis  Neurologic: Alert and oriented x4, Cranial nerve II-XII intact,: Weakness (there is weakness noted in the left upper and left lower extremity) present.     VITAL SIGNS: 24 HRS MIN & MAX LAST   Temp  Min: 97.9 °F (36.6 °C)  Max: 98.3 °F (36.8 °C) 98.2 °F (36.8 °C)   BP  Min: 116/73  Max: 152/79 126/82   Pulse  Min: 65  Max: 74  76   Resp  Min: 18  Max: 18 18   SpO2  Min: 95 %  Max: 96 % 100 %     I have reviewed the following labs:  Recent Labs   Lab 05/16/25  1113 05/17/25  0414 05/18/25  0549   WBC 7.63 6.58 5.77   RBC 3.98* 3.60* 3.65*   HGB 13.4 11.8* 12.0   HCT 38.7 35.7* 36.7*   MCV 97.2* 99.2* 100.5*   MCH 33.7* 32.8* 32.9*   MCHC 34.6 33.1 32.7*   RDW 12.8 13.2 13.2    220 224   MPV 9.7 9.3 9.6     Recent Labs   Lab 05/16/25  1113 05/18/25  0549    139   K 4.3  4.1    110*   CO2 23 23   BUN 11.3 5.7*   CREATININE 0.68 0.67   GLU 94 86   CALCIUM 10.1 8.5   ALBUMIN 4.3  --    PROT 8.4*  --    ALKPHOS 77  --    ALT 10  --    AST 17  --    BILITOT 0.4  --      Microbiology Results (last 7 days)       ** No results found for the last 168 hours. **             See below for Radiology    Assessment/Plan:  LICA Stenosis  -CTA head and neck showed dense calcified plaque at the left carotid bulb and proximal internal carotid artery with high-grade close to 99% stenosis  -continue heparin GTT   -Ronni Hernandez and Rema evaluated the patient overnight  -per patient vascular surgery states she needs further imaging and likely planning for surgery today   -MRI : A small area of restricted diffusion which is dark on ADC map and bright on FLAIR consistent with an acute infarct involving the left posterior cerebral artery territory is seen in the left parasagittal occipital lobe     Ischemic CVA  acute infarct involving the left posterior cerebral artery territory.  Cont heparin gtt for now due to thrombotic appearing carotid lesion. Cont aspirin and statin. Per vascular surg. 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 I have reviewed or attempted to review medical records based upon their availability    All of the patient's questions have been  addressed and answered. Patient's is agreeable to the above stated plan. I will continue to  monitor closely and make adjustments to medical management as needed.    Portions of this note dictated using EMR integrated voice recognition software, and may be subject to voice recognition errors not corrected at proofreading. Please contact writer for clarification if needed.   _____________________________________________________________________    Malnutrition Status:  Nutrition consulted. Most recent weight and BMI monitored-     Measurements:  Wt Readings from Last 1 Encounters:   05/16/25 55.5 kg (122 lb 5.7 oz)   Body mass index is 21.46 kg/m².    Patient has been screened and assessed by RD.    Malnutrition Type:  Context:    Level:      Malnutrition Characteristic Summary:       Interventions/Recommendations (treatment strategy):        Scheduled Med:       Continuous Infusions:       PRN Meds:       Radiology:  I have personally reviewed the following imaging and agree with the radiologist.     MRI Brain Without Contrast  EXAMINATION  MRI BRAIN WITHOUT CONTRAST    CLINICAL HISTORY  Stroke, follow up;    TECHNIQUE  Multiplanar, multisequence MR images were obtained without the intravenous administration of gadolinium-based contrast media.    COMPARISON  16 May 2025 CTA head/neck    FINDINGS  Exam quality: adequate for evaluation    Brain parenchyma: Focal area of restricted diffusion measuring 1.9 cm x 1.2 cm is appreciated at the left occipital lobe, with corresponding hypointensity on the ADC map and similar distribution/size on the FLAIR images.  No other areas of abnormal diffusion signal are identified.  White matter T2/FLAIR hyperintense signal without associated restricted diffusion statistically represents changes of chronic microvascular disease. No findings to suggest acute intraparenchymal hemorrhage. No discrete mass, localized mass effect, or midline shift. Differentiation of the gray-white border is grossly preserved.    CSF spaces: No evidence of extra-axial hemorrhage, expansile fluid  collection, or focal mass-like abnormality. The basal cisterns are preserved. Sulcal volume appears normal for patient age. There is proportional enlargement of the ventricles consistent with global intracranial atrophy; no findings of hydrocephalus.    Sella/Suprasellar regions: No abnormalities.    Other findings: Normal flow signal voids within the large intracranial vessels or dural sinuses. No focal abnormalities of the regional osseous structures and extracranial soft tissues. Mastoid air cells are well aerated. Paranasal sinuses are clear, with no fluid level.    IMPRESSION  1. Acute/subacute ischemic infarct at the left occipital lobe.  2. No other convincing acute intracranial abnormality.  3. Chronic secondary findings discussed above.  ==========  This report was flagged in Epic as abnormal.    ==========    No significant discrepancy identified in relation to the teleradiology preliminary report.    Electronically signed by: oHmer Suero  Date:    05/17/2025  Time:    10:53      Akbar Berg MD  Department of Hospital Medicine   [facility]   05/18/2025

## 2025-05-18 NOTE — ANESTHESIA PREPROCEDURE EVALUATION
05/18/2025  Ashlie Chavez is a 60 y.o., female.      Pre-op Assessment    I have reviewed the Patient Summary Reports.     I have reviewed the Nursing Notes. I have reviewed the NPO Status.   I have reviewed the Medications.     Review of Systems  Anesthesia Hx:  No problems with previous Anesthesia                Social:  Smoker, Recreational Drugs       EENT/Dental:  EENT/Dental Normal  Blind os          Cardiovascular:        CAD                                          Pulmonary:   COPD      H/O histoplasmosis and lobectomy               Hepatic/GI:  Hepatic/GI Normal                    Musculoskeletal:         Spine Disorders:             Neurological:   CVA, residual symptoms Neuromuscular Disease,             Chronic Pain Syndrome                         Endocrine:  Endocrine Normal            Psych:  Psychiatric History              Physical Exam  General: Well nourished, Cooperative, Alert and Oriented    Airway:  Mallampati: II   Mouth Opening: Normal  TM Distance: Normal  Neck ROM: Normal ROM    Dental:  Edentulous    Chest/Lungs:  Clear to auscultation    Heart:  Rate: Normal    Anesthesia Plan  Type of Anesthesia, risks & benefits discussed:    Anesthesia Type: Gen ETT  Intra-op Monitoring Plan: Standard ASA Monitors  Post Op Pain Control Plan: multimodal analgesia  Induction:  IV  Airway Plan: Direct  Informed Consent: Informed consent signed with the Patient and all parties understand the risks and agree with anesthesia plan.  All questions answered.   ASA Score: 3  Day of Surgery Review of History & Physical: H&P Update referred to the surgeon/provider.    Ready For Surgery From Anesthesia Perspective.   .

## 2025-05-19 VITALS
TEMPERATURE: 99 F | WEIGHT: 122.38 LBS | OXYGEN SATURATION: 92 % | HEART RATE: 77 BPM | SYSTOLIC BLOOD PRESSURE: 120 MMHG | DIASTOLIC BLOOD PRESSURE: 73 MMHG | BODY MASS INDEX: 20.89 KG/M2 | HEIGHT: 64 IN | RESPIRATION RATE: 20 BRPM

## 2025-05-19 LAB
ANION GAP SERPL CALC-SCNC: 8 MEQ/L
BASOPHILS # BLD AUTO: 0.02 X10(3)/MCL
BASOPHILS NFR BLD AUTO: 0.2 %
BUN SERPL-MCNC: 7.6 MG/DL (ref 9.8–20.1)
CALCIUM SERPL-MCNC: 9.4 MG/DL (ref 8.4–10.2)
CHLORIDE SERPL-SCNC: 106 MMOL/L (ref 98–107)
CO2 SERPL-SCNC: 22 MMOL/L (ref 23–31)
CREAT SERPL-MCNC: 0.64 MG/DL (ref 0.55–1.02)
CREAT/UREA NIT SERPL: 12
EOSINOPHIL # BLD AUTO: 0.01 X10(3)/MCL (ref 0–0.9)
EOSINOPHIL NFR BLD AUTO: 0.1 %
ERYTHROCYTE [DISTWIDTH] IN BLOOD BY AUTOMATED COUNT: 13 % (ref 11.5–17)
GFR SERPLBLD CREATININE-BSD FMLA CKD-EPI: >60 ML/MIN/1.73/M2
GLUCOSE SERPL-MCNC: 94 MG/DL (ref 82–115)
HCT VFR BLD AUTO: 35.2 % (ref 37–47)
HGB BLD-MCNC: 12.1 G/DL (ref 12–16)
IMM GRANULOCYTES # BLD AUTO: 0.02 X10(3)/MCL (ref 0–0.04)
IMM GRANULOCYTES NFR BLD AUTO: 0.2 %
LYMPHOCYTES # BLD AUTO: 2.08 X10(3)/MCL (ref 0.6–4.6)
LYMPHOCYTES NFR BLD AUTO: 22.6 %
MCH RBC QN AUTO: 33.2 PG (ref 27–31)
MCHC RBC AUTO-ENTMCNC: 34.4 G/DL (ref 33–36)
MCV RBC AUTO: 96.4 FL (ref 80–94)
MONOCYTES # BLD AUTO: 0.78 X10(3)/MCL (ref 0.1–1.3)
MONOCYTES NFR BLD AUTO: 8.5 %
NEUTROPHILS # BLD AUTO: 6.28 X10(3)/MCL (ref 2.1–9.2)
NEUTROPHILS NFR BLD AUTO: 68.4 %
NRBC BLD AUTO-RTO: 0 %
PLATELET # BLD AUTO: 216 X10(3)/MCL (ref 130–400)
PMV BLD AUTO: 9.8 FL (ref 7.4–10.4)
POTASSIUM SERPL-SCNC: 4.1 MMOL/L (ref 3.5–5.1)
RBC # BLD AUTO: 3.65 X10(6)/MCL (ref 4.2–5.4)
SODIUM SERPL-SCNC: 136 MMOL/L (ref 136–145)
WBC # BLD AUTO: 9.19 X10(3)/MCL (ref 4.5–11.5)

## 2025-05-19 PROCEDURE — 36415 COLL VENOUS BLD VENIPUNCTURE: CPT | Performed by: PHYSICIAN ASSISTANT

## 2025-05-19 PROCEDURE — 80048 BASIC METABOLIC PNL TOTAL CA: CPT | Performed by: INTERNAL MEDICINE

## 2025-05-19 PROCEDURE — 99900031 HC PATIENT EDUCATION (STAT)

## 2025-05-19 PROCEDURE — 85025 COMPLETE CBC W/AUTO DIFF WBC: CPT | Performed by: PHYSICIAN ASSISTANT

## 2025-05-19 PROCEDURE — 94799 UNLISTED PULMONARY SVC/PX: CPT

## 2025-05-19 PROCEDURE — 25000003 PHARM REV CODE 250: Performed by: PHYSICIAN ASSISTANT

## 2025-05-19 PROCEDURE — 92523 SPEECH SOUND LANG COMPREHEN: CPT

## 2025-05-19 PROCEDURE — 99900035 HC TECH TIME PER 15 MIN (STAT)

## 2025-05-19 RX ORDER — CLOPIDOGREL BISULFATE 75 MG/1
75 TABLET ORAL DAILY
Status: DISCONTINUED | OUTPATIENT
Start: 2025-05-19 | End: 2025-05-19 | Stop reason: HOSPADM

## 2025-05-19 RX ORDER — ASPIRIN 81 MG/1
81 TABLET ORAL DAILY
Status: DISCONTINUED | OUTPATIENT
Start: 2025-05-19 | End: 2025-05-19 | Stop reason: HOSPADM

## 2025-05-19 RX ORDER — CLOPIDOGREL BISULFATE 75 MG/1
75 TABLET ORAL DAILY
Qty: 90 TABLET | Refills: 0 | Status: SHIPPED | OUTPATIENT
Start: 2025-05-20 | End: 2026-05-20

## 2025-05-19 RX ADMIN — MUPIROCIN: 20 OINTMENT TOPICAL at 08:05

## 2025-05-19 RX ADMIN — ATORVASTATIN CALCIUM 80 MG: 40 TABLET, FILM COATED ORAL at 08:05

## 2025-05-19 RX ADMIN — CLOPIDOGREL 75 MG: 75 TABLET ORAL at 08:05

## 2025-05-19 RX ADMIN — ASPIRIN 81 MG: 81 TABLET, COATED ORAL at 08:05

## 2025-05-19 NOTE — PT/OT/SLP EVAL
[facility]  Speech Language Pathology Department  Cognitive-Communication Evaluation    Patient Name:  Ashlie Chavez   MRN:  [id number]    Recommendations     General recommendations:  SLP intervention not indicated  Communication strategies:  none    Discharge therapy intensity: No Therapy Indicated  Barriers to safe discharge: none    History     Ashlie Chavez is a/n 60 y.o. female admitted with left ICA symptomatic stenosis.  Pt s/p CEA on 5/18.  SLP consulted as part of the CVA workup as MRI revealed acute/subacute occipital lobe infarct.  Pt passed nursing swallow screen.    Past Medical History:   Diagnosis Date    Arthritis     COPD (chronic obstructive pulmonary disease)     Coronary artery disease     Depression     Histoplasmosis 2018    Immune deficiency disorder     Mental disorder     Neuropathy      Previous level of Function  Education:trade school  Occupation: unemployed, disabled  Lives: with spouse  Handed: Right  Glasses: yes (reading)  Hearing Aids: no  Home Responsibilities: drives, financial management, and medication/health management  Pt reports memory deficits prior to this admission    Subjective     Patient awake, alert, and cooperative.  Patient goals: to go home   Spiritual/Cultural/Latter day Beliefs/Practices that affect care: no    Pain/Comfort: Pain Rating 1: 0/10    Respiratory Status:  room air    Objective     ORAL MUSCULATURE  Dentition: own teeth  Facial Movement: WFL  Buccal Strength & Mobility: WFL  Mandibular Strength & Mobility: WFL  Oral Labial Strength & Mobility: WFL  Lingual Strength & Mobility: WFL    SPEECH PRODUCTION  Phoneme Production: adequate  Voice Quality: adequate  Voice Production: adequate  Speech Rate: appropriate  Loudness: acceptable  Respiration: WFL for speech  Resonance: adequate  Prosody: adequate  Speech Intelligibility  Known Context: Greater that 90%  Unknown Context: Greater that 90%    AUDITORY  COMPREHENSION  Following Directions:  1-Step: 100%  2-Step: 100%  Yes/No Questions:  Biographical: 100%  Environmental: 100%  Simple: 100%    VERBAL EXPRESSION  Automatic Speech:  Functional needs: Within Functional Limits  Confrontation Naming  Objects: 100%  Wh- Questions:  Object name: 100%  Object function: 100%    COGNITION  Orientation:  Person: yes  Place: yes  Time: yes  Situation: yes   Attention:  Focused: Within Functional Limits  Sustained: Within Functional Limits  Divided: Within Functional Limits  Pragmatics:  Within Functional Limits  Memory:  Immediate: Within Functional Limits  Delayed: Impaired (6/12 Four Word Memory task)  Long Term: Within Functional Limits  Problem Solving  Functional simple: Within Functional Limits  Executive Function:  Within Functional Limits    Assessment     Pt presents at functional baseline for speech/language and cognitive abilities.    Patient Education     Patient and spouse were provided with verbal education regarding SLP POC.  Understanding was verbalized.    Plan     SLP Follow-Up:  No   Plan of Care reviewed with:  patient, spouse      Time Tracking     SLP Treatment Date:   05/19/25  Speech Start Time:  1150  Speech Stop Time:  1200     Speech Total Time (min):  10 min    Billable minutes:  Evaluation of Speech Sound Production with Comprehension and Expression, 10 minutes     05/19/2025

## 2025-05-19 NOTE — DISCHARGE SUMMARY
[facility] Hospital Medicine Discharge Summary    Admit Date: 5/16/2025  Discharge Date and Time: 5/19/202512:18 PM  Admitting Physician:  Team  Discharging Physician: Sherron Stevenson MD.  Primary Care Physician: Charlotte Islas MD  Consults: {consultation: 70623}    Discharge Diagnoses:  ***    Hospital Course:   ***  Pt was seen and examined on the day of discharge  Vitals:  VITAL SIGNS: 24 HRS MIN & MAX LAST   Temp  Min: 97.9 °F (36.6 °C)  Max: 98.8 °F (37.1 °C) 98.8 °F (37.1 °C)   BP  Min: 116/58  Max: 142/77 120/73   Pulse  Min: 69  Max: 80  77   Resp  Min: 14  Max: 22 20   SpO2  Min: 92 %  Max: 98 % (!) 92 %       Physical Exam:  ***    Procedures Performed: No admission procedures for hospital encounter.     Significant Diagnostic Studies: See Full reports for all details    Recent Labs   Lab 05/17/25  0414 05/18/25  0549 05/19/25  0501   WBC 6.58 5.77 9.19   RBC 3.60* 3.65* 3.65*   HGB 11.8* 12.0 12.1   HCT 35.7* 36.7* 35.2*   MCV 99.2* 100.5* 96.4*   MCH 32.8* 32.9* 33.2*   MCHC 33.1 32.7* 34.4   RDW 13.2 13.2 13.0    224 216   MPV 9.3 9.6 9.8       Recent Labs   Lab 05/16/25  1113 05/18/25  0549 05/19/25  0501    139 136   K 4.3 4.1 4.1    110* 106   CO2 23 23 22*   BUN 11.3 5.7* 7.6*   CREATININE 0.68 0.67 0.64   GLU 94 86 94   CALCIUM 10.1 8.5 9.4   ALBUMIN 4.3  --   --    PROT 8.4*  --   --    ALKPHOS 77  --   --    ALT 10  --   --    AST 17  --   --    BILITOT 0.4  --   --         Microbiology Results (last 7 days)       ** No results found for the last 168 hours. **             Cardiac catheterization  Procedure performed in the Invasive Lab    - See Procedure Log link below for nursing documentation    - See OpNote on Surgeries Tab for physician findings    - See Imaging Tab for radiologist dictation         Medication List        START taking these medications      clopidogreL 75 mg tablet  Commonly known as: PLAVIX  Take 1 tablet (75 mg  total) by mouth once daily.  Start taking on: May 20, 2025            CONTINUE taking these medications      albuterol 90 mcg/actuation inhaler  Commonly known as: PROVENTIL/VENTOLIN HFA     ALPRAZolam 2 MG Tab  Commonly known as: XANAX     aspirin 81 MG EC tablet  Commonly known as: ECOTRIN     ramelteon 8 mg tablet  Commonly known as: ROZEREM     rosuvastatin 20 MG tablet  Commonly known as: CRESTOR     tiZANidine 4 MG tablet  Commonly known as: ZANAFLEX     TRELEGY ELLIPTA 100-62.5-25 mcg Dsdv  Generic drug: fluticasone-umeclidin-vilanter               Where to Get Your Medications        These medications were sent to [facility] [address] [address]      Phone: [phone]   clopidogreL 75 mg tablet          Explained in detail to the patient about the discharge plan, medications, and follow-up visits. Pt understands and agrees with the treatment plan  Discharge Disposition: Short Term Hospital   Discharged Condition: stable  Diet-   Dietary Orders (From admission, onward)       Start     Ordered    05/18/25 1433  Diet Heart Healthy Standard Tray  Diet effective now        Question:  Tray type:  Answer:  Standard Tray    05/18/25 1432                   Medications Per DC med rec  Activities as tolerated   Follow-up Information       Niki Concepcion FNP. Schedule an appointment as soon as possible for a visit.    Specialty: Neurology  Contact information:  [address] [place] [address] [address] [place] [address]  [phone]               Charlotte Islas MD Follow up.    Specialty: Internal Medicine  Why: >5/23 @ 9am  Contact information:  [address]  [place] [address]  [phone]               Bashir Voss MD Follow up.    Specialty: Cardiology  Contact information:  [address]  [address]  [place] [address]  [phone]                           For further questions contact hospitalist office    Discharge  time 33 minutes    For worsening symptoms, chest pain, shortness of breath, increased abdominal pain, high grade fever, stroke or stroke like symptoms, immediately go to the nearest Emergency Room or call 911 as soon as possible.      Sherron Mcconnell M.D, on 5/19/2025. at 12:18 PM.

## 2025-05-19 NOTE — PLAN OF CARE
05/19/25 1014   Discharge Assessment   Assessment Type Discharge Planning Assessment   Confirmed/corrected address, phone number and insurance Yes   Confirmed Demographics Correct on Facesheet   Source of Information patient   When was your last doctors appointment? 02/19/25   Does patient/caregiver understand observation status Yes   Communicated MARYJANE with patient/caregiver Yes   Reason For Admission left carotid artery stenosis   People in Home spouse   Facility Arrived From: home   Do you expect to return to your current living situation? Yes   Do you have help at home or someone to help you manage your care at home? Yes   Who are your caregiver(s) and their phone number(s)? [employer]   Current cognitive status: Alert/Oriented   Walking or Climbing Stairs Difficulty no   Dressing/Bathing Difficulty no   Home Accessibility   (13 steps to enter home)   Equipment Currently Used at Home nebulizer   Patient currently being followed by outpatient case management? No   Do you currently have service(s) that help you manage your care at home? No   Do you take prescription medications? Yes   Do you have any problems affording any of your prescribed medications? No   Is the patient taking medications as prescribed? yes   Who is going to help you get home at discharge? fmly   How do you get to doctors appointments? car, drives self   Are you on dialysis? No   Discharge Plan A Home   Discharge Plan B Home   DME Needed Upon Discharge  none   Discharge Plan discussed with: Patient;Spouse/sig other   Transition of Care Barriers Underinsured   Housing Stability   In the last 12 months, was there a time when you were not able to pay the mortgage or rent on time? N   At any time in the past 12 months, were you homeless or living in a shelter (including now)? N   Transportation Needs   In the past 12 months, has lack of transportation kept you from medical appointments or from getting medications? no   In the past 12 months, has lack  of transportation kept you from meetings, work, or from getting things needed for daily living? No   Food Insecurity   Within the past 12 months, you worried that your food would run out before you got the money to buy more. Never true   Within the past 12 months, the food you bought just didn't last and you didn't have money to get more. Never true   Utilities   In the past 12 months has the electric, gas, oil, or water company threatened to shut off services in your home? No   Health Literacy   How often do you need to have someone help you when you read instructions, pamphlets, or other written material from your doctor or pharmacy? Sometimes     Pt and spouse live together. She is independent with adls. She only uses a nebulizer. She drives,no HH utilized. No needs noted for CM .

## 2025-05-19 NOTE — PLAN OF CARE
05/19/25 1217   Final Note   Assessment Type Discharge Planning Assessment   Anticipated Discharge Disposition Home   Post-Acute Status   Discharge Delays None known at this time     Pt will dc to home     No needs noted for CM

## 2025-05-19 NOTE — PROGRESS NOTES
Vascular Surgery  Progress Note    Patient Name: Ashlie Chavez                   : 1965     MRN: [id number]   Date of Admission: 2025  Date of Exam: 2025     Subjective:     Post-Op Information:  ENDARTERECTOMY, CAROTID (Left)  1 Day Post-Op    Interval History: No acute issues over nights. VSS afebrile. States she is feeling okay, ready to go home. Eating breakfast, denies issues swallowing. Has gotten up and walked to the restroom without issue.     Objective     Vital Signs (Most Recent):      Temp: 97.9 °F (36.6 °C) (25)  Pulse: 78 (25)  Resp: (!) 21 (25)  BP: (!) 116/58 (25)  SpO2: 95 % (25) Vital Signs (24h Range):    Temp:  [97.9 °F (36.6 °C)-98.4 °F (36.9 °C)] 97.9 °F (36.6 °C)  Pulse:  [61-87] 78  Resp:  [14-29] 21  SpO2:  [94 %-99 %] 95 %  BP: (116-181)/() 116/58        Intake/Output - Last 3 Shifts          0700   0659  0700   0659  0700   0659    P.O. 360 360     IV Piggyback  1000     Total Intake(mL/kg) 360 (6.5) 1360 (24.5)     Urine (mL/kg/hr) 1200 (0.9) 750 (0.6)     Stool 0 0     Total Output 1200 750     Net -840 +610            Unmeasured Urine Occurrence 2 x            Significant Labs:  CBC:   Recent Labs   Lab 25  0501   WBC 9.19   RBC 3.65*   HGB 12.1   HCT 35.2*      MCV 96.4*   MCH 33.2*   MCHC 34.4     CMP:   Recent Labs   Lab 25  0501   GLU 94   CALCIUM 9.4      K 4.1   CO2 22*      BUN 7.6*   CREATININE 0.64     Physical Exam:     Constitutional: Awake, alert, NAD, sitting up in bed eating breakfast  Cardiovascular: Palpable radial pulses  Respiratory: Normal respiratory effort  Musculoskeletal: ROM noted in all extremities    Neurologic: Strength and sensation are equal bilaterally, good facial nerve function  Skin: Left neck incision is C/D/I with overlying dermabond in place; soft without hematoma       Assessment and Plan     Ms. Chavez  is a 60 y.o. female with symptomatic left BEST now s/p left CEA. Patient doing well overall.   - recommend DAPT and statin therapy  - stable for d/c from vascular standpoint once appropriate from primary team     The above findings, diagnostics, and treatment plan were discussed with Dr. Hodgson who is in agreement with the plan of care except as stated in additional documentation.      Ruslan Malhotra PA-C  Vascular Surgery  [facility]    Active Diagnoses:     Active Diagnoses:    Diagnosis Date Noted POA    PRINCIPAL PROBLEM:  Left carotid artery stenosis [I65.22] 05/16/2025 Yes    Vision loss of left eye [H54.62] 05/16/2025 Yes    Tobacco dependency [F17.200] 05/16/2025 Unknown      Problems Resolved During this Admission:         Medications:     Continuous Infusion:    0.9% NaCl   Intravenous Continuous 50 mL/hr at 05/18/25 1539 Rate Change at 05/18/25 1539       Scheduled Medications:    atorvastatin  80 mg Oral Daily    mupirocin   Nasal BID       PRN Medications:   Current Facility-Administered Medications:     acetaminophen, 650 mg, Oral, Q4H PRN    albuterol-ipratropium, 3 mL, Nebulization, Q6H PRN    aluminum-magnesium hydroxide-simethicone, 30 mL, Oral, QID PRN    bisacodyL, 10 mg, Rectal, Daily PRN    ceFAZolin (Ancef) IV (PEDS and ADULTS), 1 g, Intravenous, On Call Procedure    dextrose 50%, 12.5 g, Intravenous, PRN    dextrose 50%, 25 g, Intravenous, PRN    glucagon (human recombinant), 1 mg, Intramuscular, PRN    glucose, 16 g, Oral, PRN    glucose, 24 g, Oral, PRN    hydrALAZINE, 10 mg, Intravenous, Q1H PRN    labetalol, 10 mg, Intravenous, Q4H PRN    melatonin, 6 mg, Oral, Nightly PRN    melatonin, 9 mg, Oral, Nightly PRN    naloxone, 0.02 mg, Intravenous, PRN    nicotine, 1 patch, Transdermal, Daily PRN    ondansetron, 8 mg, Oral, Q8H PRN    ondansetron, 4 mg, Intravenous, Q12H PRN    ondansetron, 4 mg, Intravenous, Q12H PRN    polyethylene glycol, 17 g, Oral, TID PRN     senna-docusate, 1 tablet, Oral, BID PRN    simethicone, 1 tablet, Oral, QID PRN    sodium chloride 0.9%, 10 mL, Intravenous, PRN    traMADoL, 50 mg, Oral, Q4H PRN

## 2025-05-19 NOTE — PT/OT/SLP PROGRESS
OT screened pt as she reported she just returned from bathroom.  Pt reports she is back at her baseline with ADLs, functional mobility.  Pt with chronic L numbness and slightly weaker-reports for years since neck surgery.  She reports her vision is back to normal as well.  She has support at home if needed.  She is awaiting discharge, eager to leave hospital.  No OT charge at this time as pt is back at her functional baseline.  Please re-consult if needed.  Thank you-  Time spent in room: [id number].

## 2025-05-20 LAB — PSYCHE PATHOLOGY RESULT: NORMAL

## 2025-05-21 RX ORDER — LOPERAMIDE HCL 2 MG
TABLET ORAL
COMMUNITY
End: 2025-06-02

## 2025-05-21 RX ORDER — ONDANSETRON 4 MG/1
TABLET, FILM COATED ORAL
COMMUNITY
End: 2025-06-02

## 2025-05-21 RX ORDER — LEVALBUTEROL INHALATION SOLUTION 0.63 MG/3ML
SOLUTION RESPIRATORY (INHALATION)
COMMUNITY

## 2025-05-21 RX ORDER — FAMOTIDINE 40 MG/1
TABLET, FILM COATED ORAL
COMMUNITY
End: 2025-06-02

## 2025-05-21 RX ORDER — TALC
POWDER (GRAM) TOPICAL
COMMUNITY

## 2025-05-21 RX ORDER — CHOLECALCIFEROL (VITAMIN D3) 50 MCG
TABLET ORAL
COMMUNITY

## 2025-05-21 RX ORDER — MULTIVIT-MIN/IRON/FOLIC ACID/K 18-600-40
CAPSULE ORAL
COMMUNITY

## 2025-05-21 RX ORDER — FOLIC ACID 1 MG/1
TABLET ORAL
COMMUNITY

## 2025-05-21 RX ORDER — CELECOXIB 200 MG/1
CAPSULE ORAL
COMMUNITY

## 2025-06-02 ENCOUNTER — OFFICE VISIT (OUTPATIENT)
Dept: VASCULAR SURGERY | Facility: CLINIC | Age: 60
End: 2025-06-02
Payer: MEDICAID

## 2025-06-02 VITALS
SYSTOLIC BLOOD PRESSURE: 142 MMHG | HEIGHT: 64 IN | DIASTOLIC BLOOD PRESSURE: 74 MMHG | BODY MASS INDEX: 21 KG/M2 | HEART RATE: 76 BPM

## 2025-06-02 DIAGNOSIS — F17.200 SMOKER: ICD-10-CM

## 2025-06-02 DIAGNOSIS — I65.22 LEFT CAROTID ARTERY STENOSIS: Primary | ICD-10-CM

## 2025-06-02 PROCEDURE — 3044F HG A1C LEVEL LT 7.0%: CPT | Mod: CPTII,,, | Performed by: SURGERY

## 2025-06-02 PROCEDURE — 1160F RVW MEDS BY RX/DR IN RCRD: CPT | Mod: CPTII,,, | Performed by: SURGERY

## 2025-06-02 PROCEDURE — 3077F SYST BP >= 140 MM HG: CPT | Mod: CPTII,,, | Performed by: SURGERY

## 2025-06-02 PROCEDURE — 1159F MED LIST DOCD IN RCRD: CPT | Mod: CPTII,,, | Performed by: SURGERY

## 2025-06-02 PROCEDURE — 3078F DIAST BP <80 MM HG: CPT | Mod: CPTII,,, | Performed by: SURGERY

## 2025-06-02 PROCEDURE — 99024 POSTOP FOLLOW-UP VISIT: CPT | Mod: ,,, | Performed by: SURGERY

## 2025-06-03 ENCOUNTER — TELEPHONE (OUTPATIENT)
Dept: PULMONOLOGY | Facility: CLINIC | Age: 60
End: 2025-06-03
Payer: MEDICAID

## 2025-06-03 DIAGNOSIS — I65.22 LEFT CAROTID ARTERY STENOSIS: Primary | ICD-10-CM

## 2025-06-04 ENCOUNTER — TELEPHONE (OUTPATIENT)
Dept: NEUROLOGY | Facility: CLINIC | Age: 60
End: 2025-06-04
Payer: MEDICAID

## (undated) DEVICE — HEMOSTAT SURGICEL PWD 3G

## (undated) DEVICE — BOWL STERILE LARGE 32OZ

## (undated) DEVICE — WIPE MERCL POLYVIL ACETL 3X3IN

## (undated) DEVICE — BLADE SCALP OPHTL BEVEL STR

## (undated) DEVICE — SYR 50CC LL

## (undated) DEVICE — BAG MEDI-PLAST DECANTER C-FLOW

## (undated) DEVICE — DRESSING ANTIMICROBIAL 1 INCH

## (undated) DEVICE — DISSECTOR SECTO CHERRY 3/8IN

## (undated) DEVICE — NDL HEPARIN FLUSHING

## (undated) DEVICE — LOOP STERION MINI RED 8X406MM

## (undated) DEVICE — COVER PROBE US 5.5X58L NON LTX

## (undated) DEVICE — NDL MAGELLAN SAFETY 18G 1.5IN

## (undated) DEVICE — KIT SURGICAL TURNOVER

## (undated) DEVICE — ELECTRODE PATIENT RETURN DISP

## (undated) DEVICE — GLOVE PROTEXIS HYDROGEL SZ6.5

## (undated) DEVICE — PROBE DOPPLER VTI DISP 8MHZ

## (undated) DEVICE — SUT VICRYL CTD 2-0 GI 27 SH

## (undated) DEVICE — SUT 3-0 12-18IN SILK

## (undated) DEVICE — SUT VICRYL 3-0 27 SH

## (undated) DEVICE — TOWEL OR DISP STRL BLUE 4/PK

## (undated) DEVICE — TUBE SUCTION MEDI-VAC STERILE

## (undated) DEVICE — SUT 4-0 12-18IN SILK BLACK

## (undated) DEVICE — NDL 27G X 1 1/4

## (undated) DEVICE — CLIP LIGATING HEMOCLP SMALL

## (undated) DEVICE — ADHESIVE DERMABOND ADVANCED

## (undated) DEVICE — SOL NORMAL USPCA 0.9%

## (undated) DEVICE — SUT 7/0 24IN PROLENE BL MO

## (undated) DEVICE — DRAPE INCISE IOBAN 2 13X13IN

## (undated) DEVICE — Device

## (undated) DEVICE — CLIP LIGATING MEDIUM

## (undated) DEVICE — SUT PROLENE 6-0 BV-1 30IN

## (undated) DEVICE — SUT 2-0 12-18IN SILK

## (undated) DEVICE — SPONGE SURGIFOAM 100 8.5X12X10

## (undated) DEVICE — GLOVE PROTEXIS HYDROGEL SZ7.5

## (undated) DEVICE — SUT PERMA-HAND SUTUPAK 18IN

## (undated) DEVICE — SUT MONOCYRL 4-0 PS2 UND